# Patient Record
Sex: MALE | Race: WHITE | NOT HISPANIC OR LATINO | URBAN - METROPOLITAN AREA
[De-identification: names, ages, dates, MRNs, and addresses within clinical notes are randomized per-mention and may not be internally consistent; named-entity substitution may affect disease eponyms.]

---

## 2017-01-16 ENCOUNTER — HOSPITAL ENCOUNTER (OUTPATIENT)
Facility: MEDICAL CENTER | Age: 52
End: 2017-01-16
Attending: FAMILY MEDICINE
Payer: COMMERCIAL

## 2017-01-16 ENCOUNTER — OFFICE VISIT (OUTPATIENT)
Dept: INTERNAL MEDICINE | Facility: IMAGING CENTER | Age: 52
End: 2017-01-16
Payer: COMMERCIAL

## 2017-01-16 VITALS
RESPIRATION RATE: 14 BRPM | WEIGHT: 255 LBS | BODY MASS INDEX: 34.54 KG/M2 | DIASTOLIC BLOOD PRESSURE: 74 MMHG | SYSTOLIC BLOOD PRESSURE: 130 MMHG | HEART RATE: 93 BPM | TEMPERATURE: 97.5 F | OXYGEN SATURATION: 80 % | HEIGHT: 72 IN

## 2017-01-16 DIAGNOSIS — Z00.00 WELL ADULT EXAM: ICD-10-CM

## 2017-01-16 DIAGNOSIS — R10.12 CHRONIC BILATERAL UPPER ABDOMINAL PAIN: ICD-10-CM

## 2017-01-16 DIAGNOSIS — E78.5 DYSLIPIDEMIA: ICD-10-CM

## 2017-01-16 DIAGNOSIS — L21.9 SEBORRHEIC DERMATITIS: ICD-10-CM

## 2017-01-16 DIAGNOSIS — K62.89 RECTAL PAIN: ICD-10-CM

## 2017-01-16 DIAGNOSIS — E11.9 TYPE 2 DIABETES MELLITUS WITHOUT COMPLICATION, WITHOUT LONG-TERM CURRENT USE OF INSULIN (HCC): ICD-10-CM

## 2017-01-16 DIAGNOSIS — E11.9 TYPE 2 DIABETES MELLITUS WITHOUT COMPLICATION, WITHOUT LONG-TERM CURRENT USE OF INSULIN (HCC): Primary | ICD-10-CM

## 2017-01-16 DIAGNOSIS — G89.29 CHRONIC BILATERAL UPPER ABDOMINAL PAIN: ICD-10-CM

## 2017-01-16 DIAGNOSIS — R10.11 CHRONIC BILATERAL UPPER ABDOMINAL PAIN: ICD-10-CM

## 2017-01-16 DIAGNOSIS — E66.9 OBESITY (BMI 30-39.9): ICD-10-CM

## 2017-01-16 LAB
25(OH)D3 SERPL-MCNC: 64 NG/ML (ref 30–100)
ALBUMIN SERPL BCP-MCNC: 4.7 G/DL (ref 3.2–4.9)
ALBUMIN/GLOB SERPL: 1.6 G/DL
ALP SERPL-CCNC: 61 U/L (ref 30–99)
ALT SERPL-CCNC: 20 U/L (ref 2–50)
ANION GAP SERPL CALC-SCNC: 10 MMOL/L (ref 0–11.9)
AST SERPL-CCNC: 13 U/L (ref 12–45)
BASOPHILS # BLD AUTO: 0.04 K/UL (ref 0–0.12)
BASOPHILS NFR BLD AUTO: 0.5 % (ref 0–1.8)
BILIRUB SERPL-MCNC: 0.5 MG/DL (ref 0.1–1.5)
BUN SERPL-MCNC: 23 MG/DL (ref 8–22)
CALCIUM SERPL-MCNC: 9.7 MG/DL (ref 8.5–10.5)
CHLORIDE SERPL-SCNC: 100 MMOL/L (ref 96–112)
CO2 SERPL-SCNC: 25 MMOL/L (ref 20–33)
CREAT SERPL-MCNC: 0.94 MG/DL (ref 0.5–1.4)
CREAT UR-MCNC: 60 MG/DL
CRP SERPL HS-MCNC: 2.3 MG/L (ref 0–7.5)
EOSINOPHIL # BLD: 0.12 K/UL (ref 0–0.51)
EOSINOPHIL NFR BLD AUTO: 1.6 % (ref 0–6.9)
ERYTHROCYTE [DISTWIDTH] IN BLOOD BY AUTOMATED COUNT: 40.5 FL (ref 35.9–50)
EST. AVERAGE GLUCOSE BLD GHB EST-MCNC: 246 MG/DL
GLOBULIN SER CALC-MCNC: 3 G/DL (ref 1.9–3.5)
GLUCOSE SERPL-MCNC: 253 MG/DL (ref 65–99)
HBA1C MFR BLD: 10.2 % (ref 0–5.6)
HCT VFR BLD AUTO: 41.4 % (ref 42–52)
HGB BLD-MCNC: 14.2 G/DL (ref 14–18)
IMM GRANULOCYTES # BLD AUTO: 0.04 K/UL (ref 0–0.11)
IMM GRANULOCYTES NFR BLD AUTO: 0.5 % (ref 0–0.9)
LYMPHOCYTES # BLD: 2.43 K/UL (ref 1–4.8)
LYMPHOCYTES NFR BLD AUTO: 33 % (ref 22–41)
MCH RBC QN AUTO: 28.7 PG (ref 27–33)
MCHC RBC AUTO-ENTMCNC: 34.3 G/DL (ref 33.7–35.3)
MCV RBC AUTO: 83.6 FL (ref 81.4–97.8)
MICROALBUMIN UR-MCNC: <0.7 MG/DL
MICROALBUMIN/CREAT UR: NORMAL MG/G (ref 0–30)
MONOCYTES # BLD: 0.56 K/UL (ref 0–0.85)
MONOCYTES NFR BLD AUTO: 7.6 % (ref 0–13.4)
NEUTROPHILS # BLD: 4.17 K/UL (ref 1.82–7.42)
NEUTROPHILS NFR BLD AUTO: 56.8 % (ref 44–72)
NRBC # BLD AUTO: 0 K/UL
NRBC BLD-RTO: 0 /100 WBC
PLATELET # BLD AUTO: 248 K/UL (ref 164–446)
PMV BLD AUTO: 11.7 FL (ref 9–12.9)
POTASSIUM SERPL-SCNC: 4.2 MMOL/L (ref 3.6–5.5)
PROT SERPL-MCNC: 7.7 G/DL (ref 6–8.2)
RBC # BLD AUTO: 4.95 M/UL (ref 4.7–6.1)
SODIUM SERPL-SCNC: 135 MMOL/L (ref 135–145)
TSH SERPL DL<=0.005 MIU/L-ACNC: 1.31 UIU/ML (ref 0.3–3.7)
WBC # BLD AUTO: 7.4 K/UL (ref 4.8–10.8)

## 2017-01-16 PROCEDURE — 83704 LIPOPROTEIN BLD QUAN PART: CPT

## 2017-01-16 PROCEDURE — 80061 LIPID PANEL: CPT

## 2017-01-16 PROCEDURE — 82570 ASSAY OF URINE CREATININE: CPT

## 2017-01-16 PROCEDURE — 82043 UR ALBUMIN QUANTITATIVE: CPT

## 2017-01-16 PROCEDURE — 99214 OFFICE O/P EST MOD 30 MIN: CPT | Mod: 25 | Performed by: FAMILY MEDICINE

## 2017-01-16 PROCEDURE — 86141 C-REACTIVE PROTEIN HS: CPT

## 2017-01-16 PROCEDURE — 80053 COMPREHEN METABOLIC PANEL: CPT

## 2017-01-16 PROCEDURE — 84443 ASSAY THYROID STIM HORMONE: CPT

## 2017-01-16 PROCEDURE — 85025 COMPLETE CBC W/AUTO DIFF WBC: CPT

## 2017-01-16 PROCEDURE — 83036 HEMOGLOBIN GLYCOSYLATED A1C: CPT

## 2017-01-16 PROCEDURE — 82306 VITAMIN D 25 HYDROXY: CPT

## 2017-01-16 RX ORDER — OXYCODONE HYDROCHLORIDE 10 MG/1
10 TABLET ORAL EVERY 12 HOURS PRN
Qty: 60 TAB | Refills: 0 | Status: SHIPPED | OUTPATIENT
Start: 2017-01-16 | End: 2017-01-16 | Stop reason: SDUPTHER

## 2017-01-16 RX ORDER — OXYCODONE HYDROCHLORIDE 10 MG/1
10 TABLET ORAL EVERY 12 HOURS PRN
Qty: 60 TAB | Refills: 0 | Status: SHIPPED | OUTPATIENT
Start: 2017-01-16 | End: 2017-02-17 | Stop reason: SDUPTHER

## 2017-01-16 RX ORDER — OXYCODONE HCL 10 MG/1
10 TABLET, FILM COATED, EXTENDED RELEASE ORAL EVERY 12 HOURS PRN
Qty: 60 EACH | Refills: 0 | Status: SHIPPED | OUTPATIENT
Start: 2017-01-16 | End: 2017-01-16 | Stop reason: SDUPTHER

## 2017-01-16 RX ORDER — OXYCODONE HCL 10 MG/1
10 TABLET, FILM COATED, EXTENDED RELEASE ORAL EVERY 12 HOURS PRN
Qty: 60 EACH | Refills: 0 | Status: SHIPPED | OUTPATIENT
Start: 2017-01-16 | End: 2017-02-17 | Stop reason: SDUPTHER

## 2017-01-16 NOTE — MR AVS SNAPSHOT
"        Anthony QUILES Mark   2017 9:30 AM   Office Visit   MRN: [id number]    Department:  [facility]   Dept Phone:  [phone]    Description:  Male : 1965   Provider:  Zhang Wise M.D.           Allergies as of 2017     No Known Allergies      You were diagnosed with     Rectal pain   [657750]       Chronic bilateral upper abdominal pain   [763021]       Seborrheic dermatitis   [3211231]       Type 2 diabetes mellitus without complication, without long-term current use of insulin (CMS-HCC)   [3192329]       Obesity (BMI 30-39.9)   [418198]       Dyslipidemia   [369179]       Well adult exam   [184183]         Vital Signs     Blood Pressure Pulse Temperature Respirations Height Weight    130/74 mmHg 93 36.4 °C (97.5 °F) 14 1.829 m (6' 0.01\") 115.667 kg (255 lb)    Body Mass Index Oxygen Saturation Smoking Status             34.58 kg/m2 80% Former Smoker         Basic Information     Date Of Birth Sex Race Ethnicity Preferred Language    1965 Male White Non- English      Problem List              ICD-10-CM Priority Class Noted - Resolved    IBD (inflammatory bowel disease) K63.89   Unknown - Present    Ulcer (CMS-HCC) L98.499   Unknown - Present    Dysthymic disorder F34.1   2013 - Present    IBS (irritable bowel syndrome) K58.9   2013 - Present    ASTHMA    2013 - Present    Cervical disc disease M50.90   2013 - Present    Male hypogonadism E29.1   2013 - Present    Right shoulder strain S46.911A   2013 - Present    Former smoker Z87.891   2013 - Present    DM2 (diabetes mellitus, type 2) (CMS-HCC) E11.9   4/3/2014 - Present    Sleep disorder G47.9   4/3/2014 - Present    Obesity E66.9   4/3/2014 - Present    Rectal pain K62.89   4/3/2014 - Present    Essential hypertension, benign I10   2014 - Present    Obesity (BMI 30-39.9) E66.9   2014 - Present    Stress reaction F43.0   2014 - Present      Health Maintenance        Date " Due Completion Dates    IMM HEP B VACCINE (1 of 3 - Primary Series) 1965 ---    RETINAL SCREENING 4/17/1982 ---    IMM INFLUENZA (1) 9/1/2016 ---    A1C SCREENING 12/2/2016 6/2/2016, 9/14/2015, 5/5/2014, 11/10/2012    IMM DTaP/Tdap/Td Vaccine (1 - Tdap) 6/7/2017 (Originally 4/17/1983) ---    IMM PNEUMOCOCCAL 19-64 (ADULT) MEDIUM RISK SERIES (1 of 1 - PPSV23) 6/7/2017 (Originally 4/17/1983) ---    FASTING LIPID PROFILE 6/2/2017 6/2/2016 (Done), 9/14/2015 (Done), 11/10/2012    Override on 6/2/2016: Done    Override on 9/14/2015: Done (See Labs Lipoprotein NMR)    URINE ACR / MICROALBUMIN 6/2/2017 6/2/2016, 9/14/2015, 11/10/2012    SERUM CREATININE 6/2/2017 6/2/2016, 9/14/2015, 11/10/2012    COLONOSCOPY 4/17/2022 4/17/2012 (Prv Comp)    Override on 4/17/2012: Previously completed            Current Immunizations     No immunizations on file.      Below and/or attached are the medications your provider expects you to take. Review all of your home medications and newly ordered medications with your provider and/or pharmacist. Follow medication instructions as directed by your provider and/or pharmacist. Please keep your medication list with you and share with your provider. Update the information when medications are discontinued, doses are changed, or new medications (including over-the-counter products) are added; and carry medication information at all times in the event of emergency situations     Allergies:  No Known Allergies          Medications  Valid as of: January 16, 2017 - 10:33 AM    Generic Name Brand Name Tablet Size Instructions for use    Cholecalciferol (Cap) vitamin D3 5000 UNITS Take 1 Cap by mouth every day.        Cimetidine (Tab) TAGAMET 200 MG Take 2 Tabs by mouth every day.        Clotrimazole (Cream) LOTRIMIN 1 % Apply 1 Application to affected area(s) 2 times a day.        Dapagliflozin Propanediol (Tab) FARXIGA 10 MG TAKE 1 EACH BY MOUTH EVERY DAY.        Flunisolide (Solution) NASALIDE  25 MCG/ACT (0.025%) Spray 2 Sprays in nose 2 Times a Day.        FLUoxetine HCl (Cap) PROZAC 20 MG Take 3 capsules by mouth  every day  Indications: Depression        Glucose Blood (Strip) glucose blood  1 Strip by Other route 3 times a day.        Hydrocortisone (Cream) hydrocortisone 2.5 % Apply 1 Application to affected area(s) 3 times a day.        Hydrocortisone-Aloe Vera (Cream) Hydrocortisone-Aloe Vera 1 % 1 Application by Apply externally route 2 Times a Day.        Imiquimod (Cream) ALDARA 5 % apply a thin layer to affected areas before bedtime 3 times week; wash off 6-10 hours later.        Lisinopril (Tab) PRINIVIL, ZESTRIL 40 MG Take 1 Tab by mouth every day.        Mesalamine (Tablet Delayed Response) LIALDA 1.2 GM Take 4 Tabs by mouth every morning with breakfast.        MetFORMIN HCl (Tab) GLUCOPHAGE 1000 MG Take 1 tablet by mouth two  times daily with meals        Methocarbamol (Tab) ROBAXIN 750 MG Take 1 Tab by mouth every 12 hours as needed.        Nateglinide (Tab) STARLIX 120 MG Take 1 Tab by mouth 3 times a day before meals.        OxyCODONE HCl (Tablet Extended Release 12 hour Abuse-Deterrent) OXYCONTIN 10 MG Take 1 Tab by mouth every 12 hours as needed.        OxyCODONE HCl (Tab) ROXICODONE 10 MG Take 1 Tab by mouth every 12 hours as needed.        Simvastatin (Tab) ZOCOR 40 MG Take 1 tablet by mouth  every day        TraZODone HCl (Tab) DESYREL 50 MG Take 0.5-1 Tabs by mouth at bedtime as needed for Sleep.        .                 Medicines prescribed today were sent to:     [id number] [place] - [address]    [address] [address]    Phone: [phone] Fax: [phone]    Open 24 Hours?: No    [id number] - [place] - [address] [place] [address] AT [address] OF [address] [place] [address]    [address] [place] [address] [place] [address]    Phone: [phone] Fax: [phone]    Open 24 Hours?: No    [facility] - [address]  [address] [place] [address]    Phone: [phone] Fax: [phone]    Open 24 Hours?: No      Medication refill instructions:       If your prescription bottle indicates you have medication refills left, it is not necessary to call your provider’s office. Please contact your pharmacy and they will refill your medication.    If your prescription bottle indicates you do not have any refills left, you may request refills at any time through one of the following ways: The online [facility] system (except Urgent Care), by calling your provider’s office, or by asking your pharmacy to contact your provider’s office with a refill request. Medication refills are processed only during regular business hours and may not be available until the next business day. Your provider may request additional information or to have a follow-up visit with you prior to refilling your medication.   *Please Note: Medication refills are assigned a new Rx number when refilled electronically. Your pharmacy may indicate that no refills were authorized even though a new prescription for the same medication is available at the pharmacy. Please request the medicine by name with the pharmacy before contacting your provider for a refill.        Your To Do List     Future Labs/Procedures Complete By Expires    CBC WITH DIFFERENTIAL  As directed 1/16/2018    COMP METABOLIC PANEL  As directed 1/16/2018    CRP HIGH SENSITIVE (CARDIAC)  As directed 1/16/2018    HEMOGLOBIN A1C  As directed 1/16/2018    LIPOPROTEIN QT BLOOD BY NMR  As directed 1/17/2018    MICROALBUMIN CREAT RATIO URINE  As directed 1/17/2018    TSH WITH REFLEX TO FT4  As directed 1/16/2018    VITAMIN D,25 HYDROXY  As directed 1/17/2018      Other Notes About Your Plan     Colonoscopy  4/17/2012  [facility]   [facility] 06/01/2016;  06/01/2016; Urine Drug Screen 06/02/2016             [facility] Access Code: Activation code not generated  Current [facility] Status:  Active

## 2017-01-16 NOTE — PROGRESS NOTES
SUBJECTIVE:    Chief Complaint   Patient presents with   • Diabetes   • Obesity   • Pain     & GI issues   • Neftaly Flores is a 51 y.o. male,   Established Patient     PROBLEM #1-HISTORY OF PRESENT ILLNESS  Existing Problem, but requiring re-evaluation  PATIENT STATEMENT OF PROBLEM - Diabetes, Obesity  ONSET - years  COURSE - patient is making numerous poor lifestyle choices, counseled at length.   INTENSITY/STATUS/LOCATION/RADIATION - pending labs/ poor Therapeutic Lifestyle Changes   AGGRAVATORS - inadequate Therapeutic Lifestyle Changes    RELIEVERS - meds?   TREATMENTS/COMPLIANCE/@GOAL? - same/ no/ pending labs, obesity persists    PROBLEM #2-HISTORY OF PRESENT ILLNESS  Existing Problem, but requiring re-evaluation  PATIENT STATEMENT OF PROBLEM - Medication refills   ONSET - discussed today    PROBLEM #3-HISTORY OF PRESENT ILLNESS  Existing Problem, but requiring re-evaluation  PATIENT STATEMENT OF PROBLEM - Facial seborrheic dermatitis  ONSET - year+  COURSE - improves somewhat with OTC HC cream, counseled.   INTENSITY/STATUS/LOCATION/RADIATION - mild to moderate/ facial  AGGRAVATORS - autoimmune?  RELIEVERS - as above  TREATMENTS/COMPLIANCE/@GOAL? - same/ as above/ no     No Known Allergies    Patient Active Problem List    Diagnosis Date Noted   • Essential hypertension, benign 05/06/2014   • Obesity (BMI 30-39.9) 05/06/2014   • Stress reaction 05/06/2014   • DM2 (diabetes mellitus, type 2) (CMS-HCC) 04/03/2014   • Sleep disorder 04/03/2014   • Obesity 04/03/2014   • Rectal pain 04/03/2014   • Former smoker 11/22/2013   • Right shoulder strain 06/18/2013   • Dysthymic disorder 04/19/2013   • IBS (irritable bowel syndrome) 04/19/2013   • ASTHMA 04/19/2013   • Cervical disc disease 04/19/2013   • Male hypogonadism 04/19/2013   • IBD (inflammatory bowel disease)    • Ulcer (CMS-HCC)        Outpatient Encounter Prescriptions as of 1/16/2017   Medication Sig Dispense Refill   • oxyCODONE CR  (OXYCONTIN) 10 MG Tablet Extended Release 12 hour Abuse-Deterrent Take 1 Tab by mouth every 12 hours as needed. 60 Each 0   • oxycodone immediate release (ROXICODONE) 10 MG immediate release tablet Take 1 Tab by mouth every 12 hours as needed. 60 Tab 0   • hydrocortisone 2.5 % Cream topical cream Apply 1 Application to affected area(s) 3 times a day. 3 Tube 3   • [DISCONTINUED] oxyCODONE CR (OXYCONTIN) 10 MG Tablet Extended Release 12 hour Abuse-Deterrent Take 1 Tab by mouth every 12 hours as needed. 60 Each 0   • [DISCONTINUED] oxyCODONE CR (OXYCONTIN) 10 MG Tablet Extended Release 12 hour Abuse-Deterrent Take 1 Tab by mouth every 12 hours as needed. 60 Each 0   • [DISCONTINUED] oxycodone immediate release (ROXICODONE) 10 MG immediate release tablet Take 1 Tab by mouth every 12 hours as needed. 60 Tab 0   • [DISCONTINUED] oxycodone immediate release (ROXICODONE) 10 MG immediate release tablet Take 1 Tab by mouth every 12 hours as needed. 60 Tab 0   • [DISCONTINUED] hydrocortisone 2.5 % Cream topical cream Apply 1 Application to affected area(s) 3 times a day. 3 Tube 3   • glucose blood strip 1 Strip by Other route 3 times a day. 300 Strip 4   • lisinopril (PRINIVIL, ZESTRIL) 40 MG tablet Take 1 Tab by mouth every day. 90 Tab 4   • flunisolide (NASALIDE) 25 MCG/ACT (0.025%) Solution Spray 2 Sprays in nose 2 Times a Day. 3 Bottle 4   • trazodone (DESYREL) 50 MG Tab Take 0.5-1 Tabs by mouth at bedtime as needed for Sleep. 90 Tab 1   • Hydrocortisone-Aloe Vera (GNP HYDROCORTISONE/ALOE) 1 % Cream 1 Application by Apply externally route 2 Times a Day. 1 Tube 0   • [DISCONTINUED] oxyCODONE CR (OXYCONTIN) 10 MG Tablet Extended Release 12 hour Abuse-Deterrent Take 1 Tab by mouth every 12 hours as needed. 60 Each 0   • [DISCONTINUED] oxycodone immediate release (ROXICODONE) 10 MG immediate release tablet Take 1 Tab by mouth every 12 hours as needed. 60 Tab 0   • metformin (GLUCOPHAGE) 1000 MG tablet Take 1 tablet by mouth  "two  times daily with meals 180 Tab 3   • simvastatin (ZOCOR) 40 MG Tab Take 1 tablet by mouth  every day 90 Tab 3   • fluoxetine (PROZAC) 20 MG Cap Take 3 capsules by mouth  every day  Indications: Depression 270 Cap 3   • FARXIGA 10 MG Tab TAKE 1 EACH BY MOUTH EVERY DAY. 90 Tab 3   • Cholecalciferol (VITAMIN D3) 5000 UNITS Cap Take 1 Cap by mouth every day.     • mesalamine (LIALDA) 1.2 GM Tablet Delayed Response Take 4 Tabs by mouth every morning with breakfast. 500 Each 4   • nateglinide (STARLIX) 120 MG Tab Take 1 Tab by mouth 3 times a day before meals. 270 Tab 4   • clotrimazole (LOTRIMIN) 1 % Cream Apply 1 Application to affected area(s) 2 times a day. 1 Tube 0   • imiquimod (ALDARA) 5 % cream apply a thin layer to affected areas before bedtime 3 times week; wash off 6-10 hours later. 36 Each 4   • methocarbamol (ROBAXIN) 750 MG TABS Take 1 Tab by mouth every 12 hours as needed. 60 Tab 6   • cimetidine (TAGAMET) 200 MG tablet Take 2 Tabs by mouth every day.       No facility-administered encounter medications on file as of 1/16/2017.       Social History   Substance Use Topics   • Smoking status: Former Smoker -- 1.00 packs/day for 8 years     Types: Cigarettes     Quit date: 01/01/1990   • Smokeless tobacco: Never Used   • Alcohol Use: 0.0 oz/week     0 Standard drinks or equivalent per week       Family History   Problem Relation Age of Onset   • Heart Disease Mother    • Diabetes Father    • Heart Disease Father    • Hypertension Father    • Hyperlipidemia Father    • Diabetes Brother        Patient's Past, Social, and Family History reviewed and updated by me in EPIC today.    REVIEW OF SYMPTOMS:               Pertinent Positives as above.    All other systems reviewed and negative.     OBJECTIVE:    /74 mmHg  Pulse 93  Temp(Src) 36.4 °C (97.5 °F)  Resp 14  Ht 1.829 m (6' 0.01\")  Wt 115.667 kg (255 lb)  BMI 34.58 kg/m2  SpO2 80%  Body mass index is 34.58 kg/(m^2).    Well developed, well " nourished obese male, no acute distress, non-ill appearing. Comfortable, appears stated age, pleasant and cooperative  HEAD: atraumatic, normocephalic   EYES: Conjunctiva normal, EOMI, PERRLA, acuity grossly intact.   EARS/NOSE/THROAT: TM's normal, no SSX of infection, no perforation, no hemotympanum, acuity grossly intact. Oropharynx: benign, no lesions noted. Nares: benign.   NECK: supple, no adenopathy, no thyromegaly or nodules, no JVD, no carotid bruits.   CHEST/LUNGS: clear to auscultation and percussion bilaterally. No adventitious breath sounds.   CARDIOVASCULAR: regular rate and rhythm, no murmur. PMI not displaced. Good central and peripheral pulses.   BACK: no CVA tenderness.   ABDOMEN: soft, non-tender, non-distended, no masses, no hepatosplenomegaly. Normal active bowel tones.   : deferred.   Rectal: deferred.   Extremities: warm/well-perfused, no cyanosis, clubbing, or edema.   SKIN: mild red scaly facial rash  Neuro: Mental Status: Alert and Oriented x 3. CN II-XII grossly intact. Gait normal. Non-focal, intact. Normal strength, sensation    ASSESSMENT:    1. Type 2 diabetes mellitus without complication, without long-term current use of insulin ([facility])  HEMOGLOBIN A1C   2. Obesity (BMI 30-39.9)     3. Rectal pain  oxyCODONE CR (OXYCONTIN) 10 MG Tablet Extended Release 12 hour Abuse-Deterrent    DISCONTINUED: oxyCODONE CR (OXYCONTIN) 10 MG Tablet Extended Release 12 hour Abuse-Deterrent    DISCONTINUED: oxyCODONE CR (OXYCONTIN) 10 MG Tablet Extended Release 12 hour Abuse-Deterrent   4. Chronic bilateral upper abdominal pain  oxycodone immediate release (ROXICODONE) 10 MG immediate release tablet    DISCONTINUED: oxycodone immediate release (ROXICODONE) 10 MG immediate release tablet    DISCONTINUED: oxycodone immediate release (ROXICODONE) 10 MG immediate release tablet   5. Seborrheic dermatitis  hydrocortisone 2.5 % Cream topical cream    DISCONTINUED: hydrocortisone 2.5 % Cream topical cream   6.  Dyslipidemia  COMP METABOLIC PANEL    MICROALBUMIN CREAT RATIO URINE    CRP HIGH SENSITIVE (CARDIAC)    LIPOPROTEIN QT BLOOD BY NMR    TSH WITH REFLEX TO FT4    CBC WITH DIFFERENTIAL   7. Well adult exam  VITAMIN D,25 HYDROXY       PLAN:    Total Face-to-Face time spent with patient: 30 minutes  Amount of time spent counseling patient and/or coordinating care: 20 minutes    The nature of patient counseling as below:  -Patient Education, including below topics:  -Differential Diagnoses and treatment options discussed  -Risks, benefits, alternatives discussed  -Health Maintenance Exam issues discussed  -Exercise  -Dietary recommendations discussed  -Weight Loss strategies discussed  -Therapeutic Lifestyle Changes discussed    The nature of coordination of care as below:  -Medications added: none  -Medications discontinued: none  -Medications adjusted: refills  -Continue (other) present chronic medications  -Blood Sugar Diary  -Labs: as above  -Referrals: no  -Other: no  -Seek medical attention immediately if worse    FOLLOW-UP:  -in 3 months  -and sooner if test/consult results warrant  And for Health Care Maintenance Exams  And as needed.

## 2017-01-17 NOTE — PATIENT INSTRUCTIONS
Current Outpatient Prescriptions Ordered in [facility]   Medication Sig Dispense Refill   • oxyCODONE CR (OXYCONTIN) 10 MG Tablet Extended Release 12 hour Abuse-Deterrent Take 1 Tab by mouth every 12 hours as needed. 60 Each 0   • oxycodone immediate release (ROXICODONE) 10 MG immediate release tablet Take 1 Tab by mouth every 12 hours as needed. 60 Tab 0   • hydrocortisone 2.5 % Cream topical cream Apply 1 Application to affected area(s) 3 times a day. 3 Tube 3   • glucose blood strip 1 Strip by Other route 3 times a day. 300 Strip 4   • lisinopril (PRINIVIL, ZESTRIL) 40 MG tablet Take 1 Tab by mouth every day. 90 Tab 4   • flunisolide (NASALIDE) 25 MCG/ACT (0.025%) Solution Spray 2 Sprays in nose 2 Times a Day. 3 Bottle 4   • trazodone (DESYREL) 50 MG Tab Take 0.5-1 Tabs by mouth at bedtime as needed for Sleep. 90 Tab 1   • Hydrocortisone-Aloe Vera (GNP HYDROCORTISONE/ALOE) 1 % Cream 1 Application by Apply externally route 2 Times a Day. 1 Tube 0   • metformin (GLUCOPHAGE) 1000 MG tablet Take 1 tablet by mouth two  times daily with meals 180 Tab 3   • simvastatin (ZOCOR) 40 MG Tab Take 1 tablet by mouth  every day 90 Tab 3   • fluoxetine (PROZAC) 20 MG Cap Take 3 capsules by mouth  every day  Indications: Depression 270 Cap 3   • FARXIGA 10 MG Tab TAKE 1 EACH BY MOUTH EVERY DAY. 90 Tab 3   • Cholecalciferol (VITAMIN D3) 5000 UNITS Cap Take 1 Cap by mouth every day.     • mesalamine (LIALDA) 1.2 GM Tablet Delayed Response Take 4 Tabs by mouth every morning with breakfast. 500 Each 4   • nateglinide (STARLIX) 120 MG Tab Take 1 Tab by mouth 3 times a day before meals. 270 Tab 4   • clotrimazole (LOTRIMIN) 1 % Cream Apply 1 Application to affected area(s) 2 times a day. 1 Tube 0   • imiquimod (ALDARA) 5 % cream apply a thin layer to affected areas before bedtime 3 times week; wash off 6-10 hours later. 36 Each 4   • methocarbamol (ROBAXIN) 750 MG TABS Take 1 Tab by mouth every 12 hours as needed. 60 Tab 6   • cimetidine  (TAGAMET) 200 MG tablet Take 2 Tabs by mouth every day.       No current [facility]-ordered facility-administered medications on file.

## 2017-01-19 LAB
CHOLEST SERPL-MCNC: 178 MG/DL (ref 100–199)
HDL PARTICAL NO Q4363: 32.4 UMOL/L
HDL SERPL QN: 8.9 NM
HDLC SERPL-MCNC: 59 MG/DL
HLD.LARGE SERPL-SCNC: 5.3 UMOL/L
LDL MED SERPL QN: 20.6 NM
LDL SERPL QN: 20.6 NM
LDL SERPL-SCNC: 1465 NMOL/L
LDL SMALL SERPL-SCNC: 519 NMOL/L
LDL SMALL SERPL-SCNC: 519 NMOL/L
LDLC SERPL CALC-MCNC: 100 MG/DL (ref 0–99)
LP IR SCORE Q4364: 60
TRIGL SERPL-MCNC: 94 MG/DL (ref 0–149)
VLDL LARGE SERPL-SCNC: 2.6 NMOL/L
VLDL SERPL QN: 52.6 NM

## 2017-01-23 RX ORDER — TRAZODONE HYDROCHLORIDE 50 MG/1
TABLET ORAL
Qty: 90 TAB | Refills: 1 | Status: SHIPPED | OUTPATIENT
Start: 2017-01-23 | End: 2017-10-02

## 2017-01-30 DIAGNOSIS — J01.90 ACUTE NON-RECURRENT SINUSITIS, UNSPECIFIED LOCATION: ICD-10-CM

## 2017-01-30 RX ORDER — AZITHROMYCIN 250 MG/1
TABLET, FILM COATED ORAL
Qty: 6 TAB | Refills: 0 | Status: SHIPPED | OUTPATIENT
Start: 2017-01-30 | End: 2017-04-24

## 2017-02-17 DIAGNOSIS — R10.12 CHRONIC BILATERAL UPPER ABDOMINAL PAIN: ICD-10-CM

## 2017-02-17 DIAGNOSIS — R10.11 CHRONIC BILATERAL UPPER ABDOMINAL PAIN: ICD-10-CM

## 2017-02-17 DIAGNOSIS — K62.89 RECTAL PAIN: ICD-10-CM

## 2017-02-17 DIAGNOSIS — G89.29 CHRONIC BILATERAL UPPER ABDOMINAL PAIN: ICD-10-CM

## 2017-02-17 RX ORDER — OXYCODONE HCL 10 MG/1
10 TABLET, FILM COATED, EXTENDED RELEASE ORAL EVERY 12 HOURS PRN
Qty: 60 EACH | Refills: 0 | Status: SHIPPED | OUTPATIENT
Start: 2017-02-17 | End: 2017-04-24 | Stop reason: SDUPTHER

## 2017-02-17 RX ORDER — OXYCODONE HYDROCHLORIDE 10 MG/1
10 TABLET ORAL EVERY 12 HOURS PRN
Qty: 60 TAB | Refills: 0 | Status: SHIPPED | OUTPATIENT
Start: 2017-02-17 | End: 2017-04-24 | Stop reason: SDUPTHER

## 2017-04-10 DIAGNOSIS — E78.5 DYSLIPIDEMIA: ICD-10-CM

## 2017-04-10 DIAGNOSIS — E11.9 TYPE 2 DIABETES MELLITUS WITHOUT COMPLICATION, WITHOUT LONG-TERM CURRENT USE OF INSULIN (HCC): ICD-10-CM

## 2017-04-10 DIAGNOSIS — K58.2 IRRITABLE BOWEL SYNDROME WITH BOTH CONSTIPATION AND DIARRHEA: ICD-10-CM

## 2017-04-10 DIAGNOSIS — F34.1 DYSTHYMIC DISORDER: ICD-10-CM

## 2017-04-10 DIAGNOSIS — I10 ESSENTIAL HYPERTENSION, BENIGN: ICD-10-CM

## 2017-04-10 RX ORDER — NATEGLINIDE 120 MG/1
120 TABLET ORAL
Qty: 270 TAB | Refills: 4 | Status: SHIPPED | OUTPATIENT
Start: 2017-04-10 | End: 2017-04-10 | Stop reason: SDUPTHER

## 2017-04-10 RX ORDER — LISINOPRIL 40 MG/1
40 TABLET ORAL DAILY
Qty: 90 TAB | Refills: 4 | Status: SHIPPED | OUTPATIENT
Start: 2017-04-10 | End: 2017-10-02 | Stop reason: SDUPTHER

## 2017-04-10 RX ORDER — FLUOXETINE HYDROCHLORIDE 20 MG/1
60 CAPSULE ORAL DAILY
Qty: 270 CAP | Refills: 4 | Status: SHIPPED | OUTPATIENT
Start: 2017-04-10 | End: 2017-04-10 | Stop reason: SDUPTHER

## 2017-04-10 RX ORDER — MESALAMINE 1.2 G/1
4.8 TABLET, DELAYED RELEASE ORAL
Qty: 360 EACH | Refills: 4 | Status: SHIPPED | OUTPATIENT
Start: 2017-04-10 | End: 2017-04-10 | Stop reason: SDUPTHER

## 2017-04-10 RX ORDER — SIMVASTATIN 40 MG
40 TABLET ORAL DAILY
Qty: 90 TAB | Refills: 4 | Status: SHIPPED | OUTPATIENT
Start: 2017-04-10 | End: 2017-04-10 | Stop reason: SDUPTHER

## 2017-04-11 RX ORDER — MESALAMINE 1.2 G/1
4.8 TABLET, DELAYED RELEASE ORAL
Qty: 360 EACH | Refills: 4 | Status: SHIPPED | OUTPATIENT
Start: 2017-04-11 | End: 2017-10-02 | Stop reason: SDUPTHER

## 2017-04-11 RX ORDER — FLUOXETINE HYDROCHLORIDE 20 MG/1
60 CAPSULE ORAL DAILY
Qty: 270 CAP | Refills: 4 | Status: SHIPPED | OUTPATIENT
Start: 2017-04-11 | End: 2017-10-02 | Stop reason: SDUPTHER

## 2017-04-11 RX ORDER — NATEGLINIDE 120 MG/1
120 TABLET ORAL
Qty: 270 TAB | Refills: 4 | Status: SHIPPED | OUTPATIENT
Start: 2017-04-11 | End: 2017-10-02 | Stop reason: SDUPTHER

## 2017-04-11 RX ORDER — SIMVASTATIN 40 MG
40 TABLET ORAL DAILY
Qty: 90 TAB | Refills: 4 | Status: SHIPPED | OUTPATIENT
Start: 2017-04-11 | End: 2017-10-02 | Stop reason: SDUPTHER

## 2017-04-12 DIAGNOSIS — E11.9 TYPE 2 DIABETES MELLITUS WITHOUT COMPLICATION, WITHOUT LONG-TERM CURRENT USE OF INSULIN (HCC): ICD-10-CM

## 2017-04-12 NOTE — PROGRESS NOTES
Patient's current glucometer & test strips are non formulary.  [facility] Contour Next Glucometer & test strip rx sent to preferred pharmacy.

## 2017-04-24 ENCOUNTER — OFFICE VISIT (OUTPATIENT)
Dept: INTERNAL MEDICINE | Facility: IMAGING CENTER | Age: 52
End: 2017-04-24
Payer: COMMERCIAL

## 2017-04-24 VITALS
WEIGHT: 245 LBS | DIASTOLIC BLOOD PRESSURE: 74 MMHG | HEIGHT: 72 IN | BODY MASS INDEX: 33.18 KG/M2 | SYSTOLIC BLOOD PRESSURE: 120 MMHG | HEART RATE: 86 BPM | OXYGEN SATURATION: 95 % | RESPIRATION RATE: 14 BRPM | TEMPERATURE: 97.5 F

## 2017-04-24 DIAGNOSIS — E11.9 TYPE 2 DIABETES MELLITUS WITHOUT COMPLICATION, WITHOUT LONG-TERM CURRENT USE OF INSULIN (HCC): Primary | ICD-10-CM

## 2017-04-24 DIAGNOSIS — R10.12 CHRONIC BILATERAL UPPER ABDOMINAL PAIN: ICD-10-CM

## 2017-04-24 DIAGNOSIS — G89.29 CHRONIC BILATERAL UPPER ABDOMINAL PAIN: ICD-10-CM

## 2017-04-24 DIAGNOSIS — K62.89 RECTAL PAIN: ICD-10-CM

## 2017-04-24 DIAGNOSIS — R10.11 CHRONIC BILATERAL UPPER ABDOMINAL PAIN: ICD-10-CM

## 2017-04-24 DIAGNOSIS — K58.2 IRRITABLE BOWEL SYNDROME WITH BOTH CONSTIPATION AND DIARRHEA: ICD-10-CM

## 2017-04-24 DIAGNOSIS — R53.81 PHYSICAL DECONDITIONING: ICD-10-CM

## 2017-04-24 DIAGNOSIS — E66.9 OBESITY (BMI 30-39.9): ICD-10-CM

## 2017-04-24 PROCEDURE — 99214 OFFICE O/P EST MOD 30 MIN: CPT | Performed by: FAMILY MEDICINE

## 2017-04-24 RX ORDER — OXYCODONE HCL 10 MG/1
10 TABLET, FILM COATED, EXTENDED RELEASE ORAL EVERY 12 HOURS PRN
Qty: 60 EACH | Refills: 0 | Status: SHIPPED | OUTPATIENT
Start: 2017-04-24 | End: 2017-04-24 | Stop reason: SDUPTHER

## 2017-04-24 RX ORDER — OXYCODONE HYDROCHLORIDE 10 MG/1
10 TABLET ORAL EVERY 12 HOURS PRN
Qty: 60 TAB | Refills: 0 | Status: SHIPPED | OUTPATIENT
Start: 2017-04-24 | End: 2017-04-24 | Stop reason: SDUPTHER

## 2017-04-24 RX ORDER — OXYCODONE HCL 10 MG/1
10 TABLET, FILM COATED, EXTENDED RELEASE ORAL EVERY 12 HOURS PRN
Qty: 60 EACH | Refills: 0 | Status: SHIPPED | OUTPATIENT
Start: 2017-04-24 | End: 2017-10-02 | Stop reason: SDUPTHER

## 2017-04-24 RX ORDER — OXYCODONE HYDROCHLORIDE 10 MG/1
10 TABLET ORAL EVERY 12 HOURS PRN
Qty: 60 TAB | Refills: 0 | Status: SHIPPED | OUTPATIENT
Start: 2017-04-24 | End: 2017-10-02 | Stop reason: SDUPTHER

## 2017-04-24 NOTE — MR AVS SNAPSHOT
"        Anthony Flores   2017 8:30 AM   Office Visit   MRN: [id number]    Department:  [facility]   Dept Phone:  [phone]    Description:  Male : 1965   Provider:  Zhang Wise M.D.           Reason for Visit     Non Insulin Dependent Diabetes           Allergies as of 2017     No Known Allergies      You were diagnosed with     Rectal pain   [423124]       Chronic bilateral upper abdominal pain   [489133]         Vital Signs     Blood Pressure Pulse Temperature Respirations Height Weight    120/74 mmHg 86 36.4 °C (97.5 °F) 14 1.829 m (6' 0.01\") 111.131 kg (245 lb)    Body Mass Index Oxygen Saturation Smoking Status             33.22 kg/m2 95% Former Smoker         Basic Information     Date Of Birth Sex Race Ethnicity Preferred Language    1965 Male White Non- English      Problem List              ICD-10-CM Priority Class Noted - Resolved    IBD (inflammatory bowel disease) K63.89   Unknown - Present    Ulcer (CMS-HCC) L98.499   Unknown - Present    Dysthymic disorder F34.1   2013 - Present    IBS (irritable bowel syndrome) K58.9   2013 - Present    ASTHMA    2013 - Present    Cervical disc disease M50.90   2013 - Present    Male hypogonadism E29.1   2013 - Present    Right shoulder strain S46.911A   2013 - Present    Former smoker Z87.891   2013 - Present    DM2 (diabetes mellitus, type 2) (CMS-[facility]) E11.9   4/3/2014 - Present    Sleep disorder G47.9   4/3/2014 - Present    Obesity E66.9   4/3/2014 - Present    Rectal pain K62.89   4/3/2014 - Present    Essential hypertension, benign I10   2014 - Present    Obesity (BMI 30-39.9) E66.9   2014 - Present    Stress reaction F43.0   2014 - Present      Health Maintenance        Date Due Completion Dates    IMM HEP B VACCINE (1 of 3 - Primary Series) 1965 ---    RETINAL SCREENING 1982 ---    IMM DTaP/Tdap/Td Vaccine (1 - Tdap) 2017 (Originally 1983) ---   " IMM PNEUMOCOCCAL 19-64 (ADULT) MEDIUM RISK SERIES (1 of 1 - PPSV23) 6/7/2017 (Originally 4/17/1983) ---    FASTING LIPID PROFILE 6/2/2017 6/2/2016 (Done), 9/14/2015 (Done), 11/10/2012    Override on 6/2/2016: Done    Override on 9/14/2015: Done (See Labs Lipoprotein NMR)    A1C SCREENING 7/16/2017 1/16/2017, 6/2/2016, 9/14/2015, 5/5/2014, 11/10/2012    URINE ACR / MICROALBUMIN 1/16/2018 1/16/2017, 6/2/2016, 9/14/2015, 11/10/2012    SERUM CREATININE 1/16/2018 1/16/2017, 6/2/2016, 9/14/2015, 11/10/2012    COLONOSCOPY 4/17/2022 4/17/2012 (Prv Comp)    Override on 4/17/2012: Previously completed            Current Immunizations     No immunizations on file.      Below and/or attached are the medications your provider expects you to take. Review all of your home medications and newly ordered medications with your provider and/or pharmacist. Follow medication instructions as directed by your provider and/or pharmacist. Please keep your medication list with you and share with your provider. Update the information when medications are discontinued, doses are changed, or new medications (including over-the-counter products) are added; and carry medication information at all times in the event of emergency situations     Allergies:  No Known Allergies          Medications  Valid as of: April 24, 2017 -  9:29 AM    Generic Name Brand Name Tablet Size Instructions for use    Blood Glucose Monitoring Suppl (Device) Blood Glucose Monitoring Suppl  Meter: Dispense Device of Insurance Preference. Sig. Use as directed for blood sugar monitoring. #1. NR.        Blood Glucose Monitoring Suppl (Misc) Blood Glucose Monitoring Suppl SUPPLIES Sig: use 3 times/day and prn ssx high or low sugar #300 RF x 3        Cholecalciferol (Cap) vitamin D3 5000 UNITS Take 1 Cap by mouth every day.        Cimetidine (Tab) TAGAMET 200 MG Take 2 Tabs by mouth every day.        Clotrimazole (Cream) LOTRIMIN 1 % Apply 1 Application to affected area(s) 2  times a day.        Dapagliflozin Propanediol (Tab) FARXIGA 10 MG TAKE 1 EACH BY MOUTH EVERY DAY.        Flunisolide (Solution) NASALIDE 25 MCG/ACT (0.025%) Spray 2 Sprays in nose 2 Times a Day.        FLUoxetine HCl (Cap) PROZAC 20 MG Take 3 Caps by mouth every day.        Hydrocortisone (Cream) hydrocortisone 2.5 % Apply 1 Application to affected area(s) 3 times a day.        Hydrocortisone-Aloe Vera (Cream) Hydrocortisone-Aloe Vera 1 % 1 Application by Apply externally route 2 Times a Day.        Imiquimod (Cream) ALDARA 5 % apply a thin layer to affected areas before bedtime 3 times week; wash off 6-10 hours later.        Lisinopril (Tab) PRINIVIL, ZESTRIL 40 MG Take 1 Tab by mouth every day.        Mesalamine (Tablet Delayed Response) LIALDA 1.2 GM Take 4 Tabs by mouth every morning with breakfast.        MetFORMIN HCl (Tab) GLUCOPHAGE 1000 MG Take 1 Tab by mouth 2 times a day.        Methocarbamol (Tab) ROBAXIN 750 MG Take 1 Tab by mouth every 12 hours as needed.        Nateglinide (Tab) STARLIX 120 MG Take 1 Tab by mouth 3 times a day before meals.        OxyCODONE HCl (Tablet Extended Release 12 hour Abuse-Deterrent) OXYCONTIN 10 MG Take 1 Tab by mouth every 12 hours as needed.        OxyCODONE HCl (Tab) ROXICODONE 10 MG Take 1 Tab by mouth every 12 hours as needed.        Simvastatin (Tab) ZOCOR 40 MG Take 1 Tab by mouth every day.        TraZODone HCl (Tab) DESYREL 50 MG TAKE 1/2 TO 1 TABLET BY  MOUTH AT BEDTIME AS NEEDED  FOR SLEEP.        .                 Medicines prescribed today were sent to:     [id number] [place] - [address]    [address] [facility] [facility] [place] [address]    Phone: [phone] Fax: [phone]    Open 24 Hours?: No    [id number] - [place] - [address] AT CORNER OF [address] [place] [address]    [address] [place] [address]    Phone: [phone] Fax: [phone]    Open 24 Hours?: No    [facility] - [place], [place] - [address]  [facility]    [address] [address] [facility] [address]    Phone: [phone] Fax: [phone]    Open 24 Hours?: No    [id number] - [place], [place] - [address]    [address] [facility] [address]    Phone: [phone] Fax: [phone]    Open 24 Hours?: No      Medication refill instructions:       If your prescription bottle indicates you have medication refills left, it is not necessary to call your provider’s office. Please contact your pharmacy and they will refill your medication.    If your prescription bottle indicates you do not have any refills left, you may request refills at any time through one of the following ways: The online [facility] system (except Urgent Care), by calling your provider’s office, or by asking your pharmacy to contact your provider’s office with a refill request. Medication refills are processed only during regular business hours and may not be available until the next business day. Your provider may request additional information or to have a follow-up visit with you prior to refilling your medication.   *Please Note: Medication refills are assigned a new Rx number when refilled electronically. Your pharmacy may indicate that no refills were authorized even though a new prescription for the same medication is available at the pharmacy. Please request the medicine by name with the pharmacy before contacting your provider for a refill.        Other Notes About Your Plan     Colonoscopy  4/17/2012  [facility]   CSA 06/01/2016;  06/01/2016; Urine Drug Screen 06/02/2016             [employer] Access Code: Activation code not generated  Current [facility] Status: Active

## 2017-04-24 NOTE — PROGRESS NOTES
SUBJECTIVE:    Chief Complaint   Patient presents with   • Non Insulin Dependent Diabetes   • Rectal Pain   • Irritable Bowel Syndrome       Anthony Flores is a 52 y.o. male,   Established Patient     PROBLEM #1-HISTORY OF PRESENT ILLNESS  Existing Problem, but requiring re-evaluation  PATIENT STATEMENT OF PROBLEM - DM2, Obesity, inactivity, poor Therapeutic Lifestyle Changes   ONSET - years  COURSE - he is down a few lbs. We reviewed his most recent labs.  He admits to poor Therapeutic Lifestyle Changes; he may be amenable to change. Counseled.   INTENSITY/STATUS/LOCATION/RADIATION - moderate+/ present  AGGRAVATORS - Multifactorial including inadequate Therapeutic Lifestyle Changes    RELIEVERS - meds?  TREATMENTS/COMPLIANCE/@GOAL? - same/ poor/ no     PROBLEM #2-HISTORY OF PRESENT ILLNESS  Existing Problem, but requiring re-evaluation  PATIENT STATEMENT OF PROBLEM - Rectal Pain & IBS  ONSET - years  COURSE - he is on multiple meds including Lialda and oxycodone and symptoms are stable, but still problematic.   INTENSITY/STATUS/LOCATION/RADIATION - moderate/ present  AGGRAVATORS - Multifactorial   RELIEVERS - meds  TREATMENTS/COMPLIANCE/@GOAL? - same/ inadequate Therapeutic Lifestyle Changes / stable on meds    No Known Allergies    Patient Active Problem List    Diagnosis Date Noted   • Irritable bowel syndrome with both constipation and diarrhea 04/24/2017   • Chronic bilateral upper abdominal pain 04/24/2017   • Physical deconditioning 04/24/2017   • Type 2 diabetes mellitus without complication, without long-term current use of insulin ([facility]) 04/24/2017   • Essential hypertension, benign 05/06/2014   • Obesity (BMI 30-39.9) 05/06/2014   • Stress reaction 05/06/2014   • DM2 (diabetes mellitus, type 2) (CMS-[facility]) 04/03/2014   • Sleep disorder 04/03/2014   • Obesity 04/03/2014   • Rectal pain 04/03/2014   • Former smoker 11/22/2013   • Right shoulder strain 06/18/2013   • Dysthymic disorder 04/19/2013   • IBS  (irritable bowel syndrome) 04/19/2013   • ASTHMA 04/19/2013   • Cervical disc disease 04/19/2013   • Male hypogonadism 04/19/2013   • IBD (inflammatory bowel disease)    • Ulcer (CMS-HCC)        Outpatient Encounter Prescriptions as of 4/24/2017   Medication Sig Dispense Refill   • oxyCODONE CR (OXYCONTIN) 10 MG Tablet Extended Release 12 hour Abuse-Deterrent Take 1 Tab by mouth every 12 hours as needed. 60 Each 0   • oxycodone immediate release (ROXICODONE) 10 MG immediate release tablet Take 1 Tab by mouth every 12 hours as needed. 60 Tab 0   • [DISCONTINUED] oxyCODONE CR (OXYCONTIN) 10 MG Tablet Extended Release 12 hour Abuse-Deterrent Take 1 Tab by mouth every 12 hours as needed. 60 Each 0   • [DISCONTINUED] oxyCODONE CR (OXYCONTIN) 10 MG Tablet Extended Release 12 hour Abuse-Deterrent Take 1 Tab by mouth every 12 hours as needed. 60 Each 0   • [DISCONTINUED] oxycodone immediate release (ROXICODONE) 10 MG immediate release tablet Take 1 Tab by mouth every 12 hours as needed. 60 Tab 0   • [DISCONTINUED] oxycodone immediate release (ROXICODONE) 10 MG immediate release tablet Take 1 Tab by mouth every 12 hours as needed. 60 Tab 0   • Blood Glucose Monitoring Suppl Device Meter: Dispense Device of Insurance Preference. Sig. Use as directed for blood sugar monitoring. #1. NR. 1 Device 0   • Blood Glucose Monitoring Suppl SUPPLIES Misc Sig: use 3 times/day and prn ssx high or low sugar #300 RF x 3 300 Strip 3   • fluoxetine (PROZAC) 20 MG Cap Take 3 Caps by mouth every day. 270 Cap 4   • mesalamine (LIALDA) 1.2 GM Tablet Delayed Response Take 4 Tabs by mouth every morning with breakfast. 360 Each 4   • metformin (GLUCOPHAGE) 1000 MG tablet Take 1 Tab by mouth 2 times a day. 180 Tab 4   • nateglinide (STARLIX) 120 MG Tab Take 1 Tab by mouth 3 times a day before meals. 270 Tab 4   • simvastatin (ZOCOR) 40 MG Tab Take 1 Tab by mouth every day. 90 Tab 4   • lisinopril (PRINIVIL, ZESTRIL) 40 MG tablet Take 1 Tab by mouth  every day. 90 Tab 4   • [DISCONTINUED] oxyCODONE CR (OXYCONTIN) 10 MG Tablet Extended Release 12 hour Abuse-Deterrent Take 1 Tab by mouth every 12 hours as needed. 60 Each 0   • [DISCONTINUED] oxycodone immediate release (ROXICODONE) 10 MG immediate release tablet Take 1 Tab by mouth every 12 hours as needed. 60 Tab 0   • [DISCONTINUED] azithromycin (ZITHROMAX) 250 MG Tab Take 2 tablets PO on day#1 and then 1 tablet PO daily for days#2-5. 6 Tab 0   • trazodone (DESYREL) 50 MG Tab TAKE 1/2 TO 1 TABLET BY  MOUTH AT BEDTIME AS NEEDED  FOR SLEEP. 90 Tab 1   • hydrocortisone 2.5 % Cream topical cream Apply 1 Application to affected area(s) 3 times a day. 3 Tube 3   • flunisolide (NASALIDE) 25 MCG/ACT (0.025%) Solution Spray 2 Sprays in nose 2 Times a Day. 3 Bottle 4   • Hydrocortisone-Aloe Vera (GNP HYDROCORTISONE/ALOE) 1 % Cream 1 Application by Apply externally route 2 Times a Day. 1 Tube 0   • FARXIGA 10 MG Tab TAKE 1 EACH BY MOUTH EVERY DAY. 90 Tab 3   • Cholecalciferol (VITAMIN D3) 5000 UNITS Cap Take 1 Cap by mouth every day.     • clotrimazole (LOTRIMIN) 1 % Cream Apply 1 Application to affected area(s) 2 times a day. 1 Tube 0   • imiquimod (ALDARA) 5 % cream apply a thin layer to affected areas before bedtime 3 times week; wash off 6-10 hours later. 36 Each 4   • methocarbamol (ROBAXIN) 750 MG TABS Take 1 Tab by mouth every 12 hours as needed. 60 Tab 6   • cimetidine (TAGAMET) 200 MG tablet Take 2 Tabs by mouth every day.       No facility-administered encounter medications on file as of 4/24/2017.       Social History   Substance Use Topics   • Smoking status: Former Smoker -- 1.00 packs/day for 8 years     Types: Cigarettes     Quit date: 01/01/1990   • Smokeless tobacco: Never Used   • Alcohol Use: 0.0 oz/week     0 Standard drinks or equivalent per week       Family History   Problem Relation Age of Onset   • Heart Disease Mother    • Diabetes Father    • Heart Disease Father    • Hypertension Father    •  "Hyperlipidemia Father    • Diabetes Brother        Patient's Past, Social, and Family History reviewed and updated by me in EPIC today.    REVIEW OF SYMPTOMS:               Pertinent Positives as above.    All other systems reviewed and negative.     OBJECTIVE:    /74 mmHg  Pulse 86  Temp(Src) 36.4 °C (97.5 °F)  Resp 14  Ht 1.829 m (6' 0.01\")  Wt 111.131 kg (245 lb)  BMI 33.22 kg/m2  SpO2 95%  Body mass index is 33.22 kg/(m^2).    Well developed, well nourished male, no acute distress, non-ill appearing. Comfortable, appears stated age, pleasant and cooperative  HEAD: atraumatic, normocephalic   EYES: Conjunctiva normal, EOMI, PERRLA, acuity grossly intact.   EARS/NOSE/THROAT: TM's normal, no SSX of infection, no perforation, no hemotympanum, acuity grossly intact. Oropharynx: benign, no lesions noted. Nares: benign.   NECK: supple, no adenopathy, no thyromegaly or nodules, no JVD, no carotid bruits.   CHEST/LUNGS: clear to auscultation and percussion bilaterally. No adventitious breath sounds.   CARDIOVASCULAR: regular rate and rhythm, no murmur. PMI not displaced. Good central and peripheral pulses.   BACK: no CVA tenderness.   ABDOMEN: soft, non-tender, non-distended, no masses, no hepatosplenomegaly. Normal active bowel tones.   : deferred.   Rectal: deferred.   Extremities: warm/well-perfused, no cyanosis, clubbing, or edema.   SKIN: clear, unbroken, no rashes, normal turgor.   Neuro: Mental Status: Alert and Oriented x 3. CN II-XII grossly intact. Gait normal. Non-focal, intact. Normal strength, sensation    ASSESSMENT:    1. Type 2 diabetes mellitus without complication, without long-term current use of insulin ([facility])     2. Obesity (BMI 30-39.9)     3. Physical deconditioning     4. Rectal pain  oxyCODONE CR (OXYCONTIN) 10 MG Tablet Extended Release 12 hour Abuse-Deterrent    DISCONTINUED: oxyCODONE CR (OXYCONTIN) 10 MG Tablet Extended Release 12 hour Abuse-Deterrent    DISCONTINUED: oxyCODONE " CR (OXYCONTIN) 10 MG Tablet Extended Release 12 hour Abuse-Deterrent   5. Chronic bilateral upper abdominal pain  oxycodone immediate release (ROXICODONE) 10 MG immediate release tablet    DISCONTINUED: oxycodone immediate release (ROXICODONE) 10 MG immediate release tablet    DISCONTINUED: oxycodone immediate release (ROXICODONE) 10 MG immediate release tablet   6. Irritable bowel syndrome with both constipation and diarrhea         PLAN:    Total Face-to-Face time spent with patient: 30 minutes  Amount of time spent counseling patient and/or coordinating care: 20 minutes    The nature of patient counseling as below:  -Patient Education, including below topics:  -Differential Diagnoses and treatment options discussed  -Risks, benefits, alternatives discussed  -Labs reviewed with patient in detail  -Rest/ fluids/ close observation/ OTC medications  -Exercise  -Dietary recommendations discussed  -Weight Loss strategies discussed  -He will contemplate [facility] HOME weight loss program  -Therapeutic Lifestyle Changes discussed    The nature of coordination of care as below:  -Medications added: none  -Medications discontinued: none  -Medications adjusted: refills  -Continue (other) present chronic medications  -Seek medical attention immediately if worse    FOLLOW-UP:  -in 3 months  -and sooner if test/consult results warrant  And for Health Care Maintenance Exams  And as needed.

## 2017-04-24 NOTE — PATIENT INSTRUCTIONS
Current Outpatient Prescriptions Ordered in [facility]   Medication Sig Dispense Refill   • oxyCODONE CR (OXYCONTIN) 10 MG Tablet Extended Release 12 hour Abuse-Deterrent Take 1 Tab by mouth every 12 hours as needed. 60 Each 0   • oxycodone immediate release (ROXICODONE) 10 MG immediate release tablet Take 1 Tab by mouth every 12 hours as needed. 60 Tab 0   • Blood Glucose Monitoring Suppl Device Meter: Dispense Device of Insurance Preference. Sig. Use as directed for blood sugar monitoring. #1. NR. 1 Device 0   • Blood Glucose Monitoring Suppl SUPPLIES Misc Sig: use 3 times/day and prn ssx high or low sugar #300 RF x 3 300 Strip 3   • fluoxetine (PROZAC) 20 MG Cap Take 3 Caps by mouth every day. 270 Cap 4   • mesalamine (LIALDA) 1.2 GM Tablet Delayed Response Take 4 Tabs by mouth every morning with breakfast. 360 Each 4   • metformin (GLUCOPHAGE) 1000 MG tablet Take 1 Tab by mouth 2 times a day. 180 Tab 4   • nateglinide (STARLIX) 120 MG Tab Take 1 Tab by mouth 3 times a day before meals. 270 Tab 4   • simvastatin (ZOCOR) 40 MG Tab Take 1 Tab by mouth every day. 90 Tab 4   • lisinopril (PRINIVIL, ZESTRIL) 40 MG tablet Take 1 Tab by mouth every day. 90 Tab 4   • trazodone (DESYREL) 50 MG Tab TAKE 1/2 TO 1 TABLET BY  MOUTH AT BEDTIME AS NEEDED  FOR SLEEP. 90 Tab 1   • hydrocortisone 2.5 % Cream topical cream Apply 1 Application to affected area(s) 3 times a day. 3 Tube 3   • flunisolide (NASALIDE) 25 MCG/ACT (0.025%) Solution Spray 2 Sprays in nose 2 Times a Day. 3 Bottle 4   • Hydrocortisone-Aloe Vera (GNP HYDROCORTISONE/ALOE) 1 % Cream 1 Application by Apply externally route 2 Times a Day. 1 Tube 0   • FARXIGA 10 MG Tab TAKE 1 EACH BY MOUTH EVERY DAY. 90 Tab 3   • Cholecalciferol (VITAMIN D3) 5000 UNITS Cap Take 1 Cap by mouth every day.     • clotrimazole (LOTRIMIN) 1 % Cream Apply 1 Application to affected area(s) 2 times a day. 1 Tube 0   • imiquimod (ALDARA) 5 % cream apply a thin layer to affected areas before  bedtime 3 times week; wash off 6-10 hours later. 36 Each 4   • methocarbamol (ROBAXIN) 750 MG TABS Take 1 Tab by mouth every 12 hours as needed. 60 Tab 6   • cimetidine (TAGAMET) 200 MG tablet Take 2 Tabs by mouth every day.       No current [facility]-ordered facility-administered medications on file.

## 2017-08-11 DIAGNOSIS — E11.9 TYPE 2 DIABETES MELLITUS WITHOUT COMPLICATION, WITHOUT LONG-TERM CURRENT USE OF INSULIN (HCC): ICD-10-CM

## 2017-08-11 RX ORDER — DAPAGLIFLOZIN 10 MG/1
1 TABLET, FILM COATED ORAL DAILY
Qty: 90 TAB | Refills: 3 | Status: SHIPPED | OUTPATIENT
Start: 2017-08-11 | End: 2017-10-02

## 2017-10-02 ENCOUNTER — OFFICE VISIT (OUTPATIENT)
Dept: INTERNAL MEDICINE | Facility: IMAGING CENTER | Age: 52
End: 2017-10-02
Payer: COMMERCIAL

## 2017-10-02 VITALS
DIASTOLIC BLOOD PRESSURE: 84 MMHG | HEIGHT: 72 IN | RESPIRATION RATE: 14 BRPM | WEIGHT: 246 LBS | SYSTOLIC BLOOD PRESSURE: 118 MMHG | TEMPERATURE: 97.7 F | HEART RATE: 83 BPM | OXYGEN SATURATION: 94 % | BODY MASS INDEX: 33.32 KG/M2

## 2017-10-02 DIAGNOSIS — R10.12 CHRONIC BILATERAL UPPER ABDOMINAL PAIN: ICD-10-CM

## 2017-10-02 DIAGNOSIS — E11.9 TYPE 2 DIABETES MELLITUS WITHOUT COMPLICATION, WITHOUT LONG-TERM CURRENT USE OF INSULIN (HCC): ICD-10-CM

## 2017-10-02 DIAGNOSIS — Z79.891 CHRONIC USE OF OPIATE DRUGS THERAPEUTIC PURPOSES: ICD-10-CM

## 2017-10-02 DIAGNOSIS — J30.1 CHRONIC SEASONAL ALLERGIC RHINITIS DUE TO POLLEN: ICD-10-CM

## 2017-10-02 DIAGNOSIS — E78.2 MIXED DYSLIPIDEMIA: Chronic | ICD-10-CM

## 2017-10-02 DIAGNOSIS — F34.1 DYSTHYMIC DISORDER: ICD-10-CM

## 2017-10-02 DIAGNOSIS — G89.29 CHRONIC BILATERAL UPPER ABDOMINAL PAIN: ICD-10-CM

## 2017-10-02 DIAGNOSIS — E78.5 DYSLIPIDEMIA: ICD-10-CM

## 2017-10-02 DIAGNOSIS — K58.2 IRRITABLE BOWEL SYNDROME WITH BOTH CONSTIPATION AND DIARRHEA: ICD-10-CM

## 2017-10-02 DIAGNOSIS — M50.90 CERVICAL DISC DISEASE: ICD-10-CM

## 2017-10-02 DIAGNOSIS — I10 ESSENTIAL HYPERTENSION, BENIGN: ICD-10-CM

## 2017-10-02 DIAGNOSIS — K62.89 RECTAL PAIN: ICD-10-CM

## 2017-10-02 DIAGNOSIS — E66.9 OBESITY (BMI 30-39.9): ICD-10-CM

## 2017-10-02 DIAGNOSIS — R10.11 CHRONIC BILATERAL UPPER ABDOMINAL PAIN: ICD-10-CM

## 2017-10-02 PROBLEM — R53.81 PHYSICAL DECONDITIONING: Status: RESOLVED | Noted: 2017-04-24 | Resolved: 2017-10-02

## 2017-10-02 LAB
HBA1C MFR BLD: NORMAL % (ref ?–5.8)
INT CON NEG: NEGATIVE
INT CON POS: POSITIVE

## 2017-10-02 PROCEDURE — 99214 OFFICE O/P EST MOD 30 MIN: CPT | Performed by: FAMILY MEDICINE

## 2017-10-02 PROCEDURE — 83036 HEMOGLOBIN GLYCOSYLATED A1C: CPT | Performed by: FAMILY MEDICINE

## 2017-10-02 RX ORDER — NATEGLINIDE 120 MG/1
TABLET ORAL
Qty: 360 TAB | Refills: 4 | Status: SHIPPED | OUTPATIENT
Start: 2017-10-02 | End: 2018-10-13 | Stop reason: SDUPTHER

## 2017-10-02 RX ORDER — OXYCODONE HCL 10 MG/1
10 TABLET, FILM COATED, EXTENDED RELEASE ORAL EVERY 12 HOURS PRN
Qty: 60 EACH | Refills: 0 | Status: SHIPPED | OUTPATIENT
Start: 2017-10-02 | End: 2018-02-27 | Stop reason: SDUPTHER

## 2017-10-02 RX ORDER — OXYCODONE HYDROCHLORIDE 10 MG/1
10 TABLET ORAL EVERY 12 HOURS PRN
Qty: 60 TAB | Refills: 0 | Status: SHIPPED | OUTPATIENT
Start: 2017-10-02 | End: 2017-10-02 | Stop reason: SDUPTHER

## 2017-10-02 RX ORDER — FLUNISOLIDE 0.25 MG/ML
2 SOLUTION NASAL 2 TIMES DAILY
Qty: 3 BOTTLE | Refills: 4 | Status: SHIPPED | OUTPATIENT
Start: 2017-10-02 | End: 2018-08-27 | Stop reason: SDUPTHER

## 2017-10-02 RX ORDER — MESALAMINE 1.2 G/1
4.8 TABLET, DELAYED RELEASE ORAL
Qty: 360 EACH | Refills: 4 | Status: SHIPPED | OUTPATIENT
Start: 2017-10-02 | End: 2019-02-15 | Stop reason: SDUPTHER

## 2017-10-02 RX ORDER — METFORMIN HYDROCHLORIDE 500 MG/1
1000 TABLET, EXTENDED RELEASE ORAL 2 TIMES DAILY
Qty: 360 TAB | Refills: 4 | Status: SHIPPED | OUTPATIENT
Start: 2017-10-02 | End: 2019-05-03 | Stop reason: SDUPTHER

## 2017-10-02 RX ORDER — FLUOXETINE HYDROCHLORIDE 20 MG/1
60 CAPSULE ORAL DAILY
Qty: 270 CAP | Refills: 4 | Status: SHIPPED | OUTPATIENT
Start: 2017-10-02 | End: 2019-11-18

## 2017-10-02 RX ORDER — SIMVASTATIN 40 MG
40 TABLET ORAL EVERY EVENING
Qty: 90 TAB | Refills: 4 | Status: SHIPPED | OUTPATIENT
Start: 2017-10-02 | End: 2018-10-27 | Stop reason: SDUPTHER

## 2017-10-02 RX ORDER — OXYCODONE HYDROCHLORIDE 10 MG/1
10 TABLET ORAL EVERY 12 HOURS PRN
Qty: 60 TAB | Refills: 0 | Status: SHIPPED | OUTPATIENT
Start: 2017-10-02 | End: 2018-02-27 | Stop reason: SDUPTHER

## 2017-10-02 RX ORDER — OXYCODONE HCL 10 MG/1
10 TABLET, FILM COATED, EXTENDED RELEASE ORAL EVERY 12 HOURS PRN
Qty: 60 EACH | Refills: 0 | Status: SHIPPED | OUTPATIENT
Start: 2017-10-02 | End: 2017-10-02 | Stop reason: SDUPTHER

## 2017-10-02 RX ORDER — METHOCARBAMOL 750 MG/1
750 TABLET, FILM COATED ORAL EVERY 12 HOURS PRN
Qty: 60 TAB | Refills: 6 | Status: SHIPPED | OUTPATIENT
Start: 2017-10-02 | End: 2018-08-27 | Stop reason: SDUPTHER

## 2017-10-02 RX ORDER — LISINOPRIL 40 MG/1
40 TABLET ORAL DAILY
Qty: 90 TAB | Refills: 4 | Status: SHIPPED | OUTPATIENT
Start: 2017-10-02 | End: 2018-10-27 | Stop reason: SDUPTHER

## 2017-10-02 ASSESSMENT — LIFESTYLE VARIABLES: HISTORY_ALCOHOL_USE: 0

## 2017-10-02 ASSESSMENT — ENCOUNTER SYMPTOMS: DEPRESSION: 1

## 2017-10-02 NOTE — PROGRESS NOTES
Chief Complaint   Patient presents with   • Non Insulin Dependent Diabetes       HPI:  Patient is a 52 y.o. male established patient who presents today to transfer care from Dr. Wise to myself and to obtain refills for his medications. He has chronic uncontrolled DMII and does not want insulin therapy to improve his condition. He is well aware of the myriad of health issues associated with DM and has a significant family history of cardiovascular disease/death. He freely admits to having average BG 250s-400s, does not exercise, does not follow ADA diet, and has refused retinal exam and all vaccines, now and in the past. He also has chronic IBS, with both diarrhea and constipation features, managed with chronic narcotics, Liadla, asparagus and Activa yogurt. He is currently having 4-5 formed BMs per day at this time. He also suffers from chronic obesity and chronic essential HTN controlled on daily lisinopril. He also has chronic cervical spine disease with spasm controlled with PRN robaxin use. 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 He is in good spirits today overall.     Patient Active Problem List    Diagnosis Date Noted   • Mixed dyslipidemia 10/02/2017   • Chronic use of opiate for therapeutic purpose 10/02/2017   • Irritable bowel syndrome with both constipation and diarrhea 04/24/2017   • Chronic bilateral upper abdominal pain 04/24/2017   • Type 2 diabetes mellitus without complication, without long-term current use of insulin (HCC) 04/24/2017   • Essential hypertension, benign 05/06/2014   • Obesity (BMI 30-39.9) 05/06/2014   • Rectal pain 04/03/2014   • Former smoker 11/22/2013   • Dysthymic disorder 04/19/2013   • Cervical disc disease 04/19/2013   • Male hypogonadism 04/19/2013   • IBD (inflammatory bowel disease)      Past medical, surgical, family, and social history was reviewed and updated in Epic chart by me today.     Medications and  "allergies reviewed and updated in Epic chart by me today.     ROS:  Pertinent positives listed above in HPI. All other systems have been reviewed and are negative.    PE:   /84   Pulse 83   Temp 36.5 °C (97.7 °F)   Resp 14   Ht 1.829 m (6' 0.01\")   Wt 111.6 kg (246 lb)   SpO2 94%   BMI 33.36 kg/m²   Vital signs reviewed with patient.     Gen: Well developed; well nourished; no acute distress; age appropriate appearance   HEENT: Normocephalic; atraumatic; PEERLA b/l; sclera clear b/l; b/l external auditory canals WNL; b/l TM WNL; oropharynx clear; oral mucosa moist; tongue midline; dentition adequate   Neck: No adenopathy; no thyromegaly  CV: Regular rate and rhythm; S1/ S2 present; no murmur, gallop or rub noted  Pulm: No respiratory distress; clear to ascultation b/l; no wheezing or stridor noted b/l  Abd: Adequate bowel sounds noted; soft and nontender; no rebound, rigidity, nor distention; obese  Extremities: No peripheral edema b/l LE extremities/ no clubbing nor cyanosis noted  Skin: Warm and dry; no rashes noted   Neuro: No focal deficits noted   Psych: AAOx4; mood and affect are appropriate  B/l monofilament exam done today with no deficits noted; excellent foot and toenail health noted b/l    A/P:  1. Chronic irritable bowel syndrome with both constipation and diarrhea  Stable/ well controlled/ pt to continue current daily medications at this time.   - CONTROLLED SUBSTANCE TREATMENT AGREEMENT  - [facility] PAIN MANAGEMENT SCREEN; Future  - oxycodone immediate release (ROXICODONE) 10 MG immediate release tablet; Take 1 Tab by mouth every 12 hours as needed.  Dispense: 60 Tab; Refill: 0  - oxyCODONE CR (OXYCONTIN) 10 MG Tablet Extended Release 12 hour Abuse-Deterrent; Take 1 Tab by mouth every 12 hours as needed.  Dispense: 60 Each; Refill: 0  - mesalamine (LIALDA) 1.2 GM Tablet Delayed Response; Take 4 Tabs by mouth every morning with breakfast.  Dispense: 360 Each; Refill: 4    2. Chronic " bilateral upper abdominal pain  Stable/ well controlled/ pt to continue current daily medications  - CONTROLLED SUBSTANCE TREATMENT AGREEMENT  - [facility] PAIN MANAGEMENT SCREEN; Future  - oxycodone immediate release (ROXICODONE) 10 MG immediate release tablet; Take 1 Tab by mouth every 12 hours as needed.  Dispense: 60 Tab; Refill: 0  - oxyCODONE CR (OXYCONTIN) 10 MG Tablet Extended Release 12 hour Abuse-Deterrent; Take 1 Tab by mouth every 12 hours as needed.  Dispense: 60 Each; Refill: 0    3. Chronic rectal pain  Stable/ well controlled/ pt to continue current daily medications  - CONTROLLED SUBSTANCE TREATMENT AGREEMENT  - [facility] PAIN MANAGEMENT SCREEN; Future  - oxycodone immediate release (ROXICODONE) 10 MG immediate release tablet; Take 1 Tab by mouth every 12 hours as needed.  Dispense: 60 Tab; Refill: 0  - oxyCODONE CR (OXYCONTIN) 10 MG Tablet Extended Release 12 hour Abuse-Deterrent; Take 1 Tab by mouth every 12 hours as needed.  Dispense: 60 Each; Refill: 0    4. Chronic use of opiate drugs therapeutic purposes  CSA/drug screen updated today/  reviewed today with no abnormalities noted.   - CONTROLLED SUBSTANCE TREATMENT AGREEMENT  - [facility] PAIN MANAGEMENT SCREEN; Future  - oxycodone immediate release (ROXICODONE) 10 MG immediate release tablet; Take 1 Tab by mouth every 12 hours as needed.  Dispense: 60 Tab; Refill: 0  - oxyCODONE CR (OXYCONTIN) 10 MG Tablet Extended Release 12 hour Abuse-Deterrent; Take 1 Tab by mouth every 12 hours as needed.  Dispense: 60 Each; Refill: 0    5. Chronic obesity (BMI 30-39.9)  Uncontrolled/ pt knows that he needs to exercise daily and does not want to at this time.   - Patient identified as having weight management issue.  Appropriate orders and counseling given.    6. Chronic type 2 diabetes mellitus without complication, without long-term current use of insulin (HCC)  Uncontrolled/ HgA1c 10.7 today. Pt is not interested in injectable medication which is  the absolute appropriate next step for treatment. He is willing to maximize his current oral medications. He is non compliant with ADA diet, exercise, and retinal exam related to DM and is very well versed about the potential risks/ complications that his current behaviors can lead to.   - metformin ER (GLUCOPHAGE XR) 500 MG TABLET SR 24 HR; Take 2 Tabs by mouth 2 times a day.  Dispense: 360 Tab; Refill: 4  - nateglinide (STARLIX) 120 MG Tab; Take one tablet by mouth before each meal and before bed daily.  Dispense: 360 Tab; Refill: 4  - POCT A1C  - Diabetic Monofilament LE Exam    7. Chronic dysthymic disorder  Stable/ pt to continue current prozac dose daily/ no safety concerns noted.  - fluoxetine (PROZAC) 20 MG Cap; Take 3 Caps by mouth every day.  Dispense: 270 Cap; Refill: 4    8. Mixed dyslipidemia  Stable/ pt to continue daily statin/ lipid panel due 1/18  - simvastatin (ZOCOR) 40 MG Tab; Take 1 Tab by mouth every evening.  Dispense: 90 Tab; Refill: 4    9. Chronic cervical disc disease  Stable/ symptoms controlled with PRN robaxin use  - methocarbamol (ROBAXIN) 750 MG Tab; Take 1 Tab by mouth every 12 hours as needed.  Dispense: 60 Tab; Refill: 6    10. Chronic essential hypertension, benign  Stable/ pt to continue daily lisinopril.   - lisinopril (PRINIVIL, ZESTRIL) 40 MG tablet; Take 1 Tab by mouth every day.  Dispense: 90 Tab; Refill: 4     All medication refills sent to his pharmacy in [place], [place] per his request. Due to his ongoing non compliance with optimal disease management, he is high risk for medical complications, and he is well aware of this potential. I would like to see him in January 2018 for annual fasting labs/ repeat HgA1c/ medication recheck. He is to call me 24/7 if he has any issues with medication changes/ changes in his overall condition.

## 2018-02-27 ENCOUNTER — HOSPITAL ENCOUNTER (OUTPATIENT)
Facility: MEDICAL CENTER | Age: 53
End: 2018-02-27
Attending: FAMILY MEDICINE
Payer: COMMERCIAL

## 2018-02-27 ENCOUNTER — OFFICE VISIT (OUTPATIENT)
Dept: INTERNAL MEDICINE | Facility: IMAGING CENTER | Age: 53
End: 2018-02-27
Payer: COMMERCIAL

## 2018-02-27 VITALS
BODY MASS INDEX: 33.32 KG/M2 | SYSTOLIC BLOOD PRESSURE: 138 MMHG | DIASTOLIC BLOOD PRESSURE: 80 MMHG | RESPIRATION RATE: 14 BRPM | WEIGHT: 246 LBS | OXYGEN SATURATION: 95 % | HEART RATE: 80 BPM | HEIGHT: 72 IN | TEMPERATURE: 97.9 F

## 2018-02-27 DIAGNOSIS — M50.90 CERVICAL DISC DISEASE: Chronic | ICD-10-CM

## 2018-02-27 DIAGNOSIS — B07.8 OTHER VIRAL WARTS: ICD-10-CM

## 2018-02-27 DIAGNOSIS — Z79.891 CHRONIC USE OF OPIATE DRUGS THERAPEUTIC PURPOSES: ICD-10-CM

## 2018-02-27 DIAGNOSIS — Z12.5 SCREENING FOR PROSTATE CANCER: ICD-10-CM

## 2018-02-27 DIAGNOSIS — Z00.00 HEALTH CARE MAINTENANCE: ICD-10-CM

## 2018-02-27 DIAGNOSIS — R10.11 CHRONIC BILATERAL UPPER ABDOMINAL PAIN: Chronic | ICD-10-CM

## 2018-02-27 DIAGNOSIS — I10 ESSENTIAL HYPERTENSION, BENIGN: Chronic | ICD-10-CM

## 2018-02-27 DIAGNOSIS — R10.12 CHRONIC BILATERAL UPPER ABDOMINAL PAIN: Chronic | ICD-10-CM

## 2018-02-27 DIAGNOSIS — E11.9 TYPE 2 DIABETES MELLITUS WITHOUT COMPLICATION, WITHOUT LONG-TERM CURRENT USE OF INSULIN (HCC): Chronic | ICD-10-CM

## 2018-02-27 DIAGNOSIS — E78.2 MIXED DYSLIPIDEMIA: Chronic | ICD-10-CM

## 2018-02-27 DIAGNOSIS — E66.9 OBESITY (BMI 30-39.9): Chronic | ICD-10-CM

## 2018-02-27 DIAGNOSIS — G89.29 CHRONIC BILATERAL UPPER ABDOMINAL PAIN: Chronic | ICD-10-CM

## 2018-02-27 DIAGNOSIS — F34.1 DYSTHYMIC DISORDER: Chronic | ICD-10-CM

## 2018-02-27 DIAGNOSIS — Z79.891 CHRONIC USE OF OPIATE FOR THERAPEUTIC PURPOSE: Chronic | ICD-10-CM

## 2018-02-27 DIAGNOSIS — K58.2 IRRITABLE BOWEL SYNDROME WITH BOTH CONSTIPATION AND DIARRHEA: ICD-10-CM

## 2018-02-27 DIAGNOSIS — K62.89 RECTAL PAIN: Chronic | ICD-10-CM

## 2018-02-27 LAB
25(OH)D3 SERPL-MCNC: 49 NG/ML (ref 30–100)
ALBUMIN SERPL BCP-MCNC: 4.7 G/DL (ref 3.2–4.9)
ALBUMIN/GLOB SERPL: 1.7 G/DL
ALP SERPL-CCNC: 66 U/L (ref 30–99)
ALT SERPL-CCNC: 26 U/L (ref 2–50)
ANION GAP SERPL CALC-SCNC: 11 MMOL/L (ref 0–11.9)
AST SERPL-CCNC: 13 U/L (ref 12–45)
BASOPHILS # BLD AUTO: 0.5 % (ref 0–1.8)
BASOPHILS # BLD: 0.04 K/UL (ref 0–0.12)
BILIRUB SERPL-MCNC: 0.4 MG/DL (ref 0.1–1.5)
BUN SERPL-MCNC: 14 MG/DL (ref 8–22)
CALCIUM SERPL-MCNC: 9.6 MG/DL (ref 8.5–10.5)
CHLORIDE SERPL-SCNC: 100 MMOL/L (ref 96–112)
CHOLEST SERPL-MCNC: 193 MG/DL (ref 100–199)
CO2 SERPL-SCNC: 25 MMOL/L (ref 20–33)
CREAT SERPL-MCNC: 0.91 MG/DL (ref 0.5–1.4)
CREAT UR-MCNC: 46.1 MG/DL
EOSINOPHIL # BLD AUTO: 0.13 K/UL (ref 0–0.51)
EOSINOPHIL NFR BLD: 1.8 % (ref 0–6.9)
ERYTHROCYTE [DISTWIDTH] IN BLOOD BY AUTOMATED COUNT: 41.5 FL (ref 35.9–50)
GLOBULIN SER CALC-MCNC: 2.8 G/DL (ref 1.9–3.5)
GLUCOSE SERPL-MCNC: 295 MG/DL (ref 65–99)
HBA1C MFR BLD: 10.6 % (ref ?–5.8)
HCT VFR BLD AUTO: 43.4 % (ref 42–52)
HDLC SERPL-MCNC: 60 MG/DL
HGB BLD-MCNC: 14.7 G/DL (ref 14–18)
IMM GRANULOCYTES # BLD AUTO: 0.04 K/UL (ref 0–0.11)
IMM GRANULOCYTES NFR BLD AUTO: 0.5 % (ref 0–0.9)
INT CON NEG: NEGATIVE
INT CON POS: POSITIVE
LDLC SERPL CALC-MCNC: 115 MG/DL
LYMPHOCYTES # BLD AUTO: 2.51 K/UL (ref 1–4.8)
LYMPHOCYTES NFR BLD: 34 % (ref 22–41)
MCH RBC QN AUTO: 29 PG (ref 27–33)
MCHC RBC AUTO-ENTMCNC: 33.9 G/DL (ref 33.7–35.3)
MCV RBC AUTO: 85.6 FL (ref 81.4–97.8)
MICROALBUMIN UR-MCNC: <0.7 MG/DL
MICROALBUMIN/CREAT UR: NORMAL MG/G (ref 0–30)
MONOCYTES # BLD AUTO: 0.58 K/UL (ref 0–0.85)
MONOCYTES NFR BLD AUTO: 7.9 % (ref 0–13.4)
NEUTROPHILS # BLD AUTO: 4.08 K/UL (ref 1.82–7.42)
NEUTROPHILS NFR BLD: 55.3 % (ref 44–72)
NRBC # BLD AUTO: 0 K/UL
NRBC BLD-RTO: 0 /100 WBC
PLATELET # BLD AUTO: 245 K/UL (ref 164–446)
PMV BLD AUTO: 11.7 FL (ref 9–12.9)
POTASSIUM SERPL-SCNC: 4.2 MMOL/L (ref 3.6–5.5)
PROT SERPL-MCNC: 7.5 G/DL (ref 6–8.2)
PSA SERPL-MCNC: 0.24 NG/ML (ref 0–4)
RBC # BLD AUTO: 5.07 M/UL (ref 4.7–6.1)
SODIUM SERPL-SCNC: 136 MMOL/L (ref 135–145)
TRIGL SERPL-MCNC: 90 MG/DL (ref 0–149)
TSH SERPL DL<=0.005 MIU/L-ACNC: 1.39 UIU/ML (ref 0.38–5.33)
WBC # BLD AUTO: 7.4 K/UL (ref 4.8–10.8)

## 2018-02-27 PROCEDURE — 82570 ASSAY OF URINE CREATININE: CPT

## 2018-02-27 PROCEDURE — 84153 ASSAY OF PSA TOTAL: CPT

## 2018-02-27 PROCEDURE — 82306 VITAMIN D 25 HYDROXY: CPT

## 2018-02-27 PROCEDURE — 80053 COMPREHEN METABOLIC PANEL: CPT

## 2018-02-27 PROCEDURE — 80061 LIPID PANEL: CPT

## 2018-02-27 PROCEDURE — 83036 HEMOGLOBIN GLYCOSYLATED A1C: CPT | Performed by: FAMILY MEDICINE

## 2018-02-27 PROCEDURE — 99214 OFFICE O/P EST MOD 30 MIN: CPT | Performed by: FAMILY MEDICINE

## 2018-02-27 PROCEDURE — 85025 COMPLETE CBC W/AUTO DIFF WBC: CPT

## 2018-02-27 PROCEDURE — 84443 ASSAY THYROID STIM HORMONE: CPT

## 2018-02-27 PROCEDURE — 82043 UR ALBUMIN QUANTITATIVE: CPT

## 2018-02-27 RX ORDER — OXYCODONE HCL 10 MG/1
10 TABLET, FILM COATED, EXTENDED RELEASE ORAL EVERY 12 HOURS PRN
Qty: 60 EACH | Refills: 0 | Status: SHIPPED | OUTPATIENT
Start: 2018-04-30 | End: 2018-05-30

## 2018-02-27 RX ORDER — OXYCODONE HYDROCHLORIDE 10 MG/1
10 TABLET ORAL EVERY 12 HOURS PRN
Qty: 60 TAB | Refills: 0 | Status: SHIPPED | OUTPATIENT
Start: 2018-04-30 | End: 2018-05-30

## 2018-02-27 RX ORDER — IMIQUIMOD 12.5 MG/.25G
CREAM TOPICAL
Qty: 36 EACH | Refills: 4 | Status: SHIPPED | OUTPATIENT
Start: 2018-02-27 | End: 2024-01-02 | Stop reason: SDUPTHER

## 2018-02-27 RX ORDER — OXYCODONE HYDROCHLORIDE 10 MG/1
10 TABLET ORAL EVERY 12 HOURS PRN
Qty: 60 TAB | Refills: 0 | Status: SHIPPED | OUTPATIENT
Start: 2018-03-30 | End: 2018-02-27 | Stop reason: SDUPTHER

## 2018-02-27 RX ORDER — FLUOXETINE HYDROCHLORIDE 20 MG/1
CAPSULE ORAL
Qty: 360 CAP | Refills: 4 | Status: SHIPPED | OUTPATIENT
Start: 2018-02-27 | End: 2018-08-27

## 2018-02-27 RX ORDER — OXYCODONE HYDROCHLORIDE 10 MG/1
10 TABLET ORAL EVERY 12 HOURS PRN
Qty: 60 TAB | Refills: 0 | Status: SHIPPED | OUTPATIENT
Start: 2018-02-27 | End: 2018-02-27 | Stop reason: SDUPTHER

## 2018-02-27 RX ORDER — OXYCODONE HCL 10 MG/1
10 TABLET, FILM COATED, EXTENDED RELEASE ORAL EVERY 12 HOURS PRN
Qty: 60 EACH | Refills: 0 | Status: SHIPPED | OUTPATIENT
Start: 2018-03-30 | End: 2018-02-27 | Stop reason: SDUPTHER

## 2018-02-27 RX ORDER — OXYCODONE HCL 10 MG/1
10 TABLET, FILM COATED, EXTENDED RELEASE ORAL EVERY 12 HOURS PRN
Qty: 60 EACH | Refills: 0 | Status: SHIPPED | OUTPATIENT
Start: 2018-02-27 | End: 2018-02-27 | Stop reason: SDUPTHER

## 2018-02-27 ASSESSMENT — PATIENT HEALTH QUESTIONNAIRE - PHQ9: CLINICAL INTERPRETATION OF PHQ2 SCORE: 0

## 2018-02-27 NOTE — PROGRESS NOTES
Chief Complaint   Patient presents with   • Non Insulin Dependent Diabetes   • Irritable Bowel Syndrome       HPI:  Patient is a 52 y.o. male established patient who presents today for follow-up on chronic, uncontrolled DMII and chronic IBS. 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 He continues to be marginally compliant with all aspects of his health care and freely admits to poor diet and lack of exercise. He has chronic uncontrolled DMII and is not interested in medication changes or improving his HgA1c. He also suffers from chronic IBS with associated chronic abdominal/rectal pain controlled with chronic Oxycontin and Roxicodone.  Pt reports that his pain is adequately controlled on Oxycontin and Roxicodone and activity is improved with medication use. He does not exercise regularly and denies side effects related to narcotic use . He continues to take medication as prescribed and is not veering from agreed treatment regimen. Pt has failed non narcotic treatment options in the past and benefits from daily narcotic use. He does have a history of depression but not anxiety. Treatment goals discussed today and Opioid risk score is 1. His drug tox screen is UTD and consistent with reported medication use. I have reviewed his medical records and  today, and have determined that controlled substance treatment is medically indicated at this time. He also has chronic viral warts and would like refill for Aldara sent to [place] pharmacy. He reports that his other chronic medical conditions are stable at this time.      Patient Active Problem List    Diagnosis Date Noted   • Mixed dyslipidemia 10/02/2017   • Chronic use of opiate for therapeutic purpose 10/02/2017   • Irritable bowel syndrome with both constipation and diarrhea 04/24/2017   • Chronic bilateral upper abdominal pain 04/24/2017   • Type 2 diabetes mellitus without  "complication, without long-term current use of insulin (HCC) 04/24/2017   • Essential hypertension, benign 05/06/2014   • Obesity (BMI 30-39.9) 05/06/2014   • Rectal pain 04/03/2014   • Former smoker 11/22/2013   • Dysthymic disorder 04/19/2013   • Cervical disc disease 04/19/2013   • Male hypogonadism 04/19/2013   • IBD (inflammatory bowel disease)      Past medical, surgical, family, and social history was reviewed in [facility] chart by me today.     Medications and allergies reviewed and updated in Epic chart by me today.     ROS:  Pertinent positives listed above in HPI. All other systems have been reviewed and are negative.    PE:   /80   Pulse 80   Temp 36.6 °C (97.9 °F)   Resp 14   Ht 1.829 m (6' 0.01\")   Wt 111.6 kg (246 lb)   SpO2 95%   BMI 33.36 kg/m²   Vital signs reviewed with patient.     Gen: Well developed; well nourished; no acute distress; age appropriate appearance   HEENT: Normocephalic; atraumatic; PEERLA b/l; sclera clear b/l; b/l external auditory canals WNL; b/l TM WNL; nares patent b/l; oropharynx clear; oral mucosa moist; tongue midline; dentition adequate   Neck: No adenopathy; no thyromegaly  CV: Regular rate and rhythm; S1/ S2 present; no murmur, gallop or rub noted  Pulm: No respiratory distress; clear to ascultation b/l; no wheezing or stridor noted b/l  Abd: Adequate bowel sounds noted; soft and nontender; no rebound, rigidity, nor distention; obese  Extremities: No peripheral edema b/l LE extremities/ no clubbing nor cyanosis noted  Skin: Warm and dry; no rashes noted   Neuro: No focal deficits noted   Psych: AAOx4; mood and affect are baseline    A/P:  1. Chronic bilateral upper abdominal pain  Stable/ controlled with chronic pain medication use/ Consent signed today and  reviewed without any issues noted. Pt to continue current pain medication routine. Pt is well versed in safe use of narcotic medications and has no perceived side effects.   - Consent for Opiate " Prescription  - oxyCODONE CR (OXYCONTIN) 10 MG Tablet Extended Release 12 hour Abuse-Deterrent; Take 1 Tab by mouth every 12 hours as needed for up to 30 days.  Dispense: 60 Each; Refill: 0  - oxyCODONE immediate release (ROXICODONE) 10 MG immediate release tablet; Take 1 Tab by mouth every 12 hours as needed for up to 30 days.  Dispense: 60 Tab; Refill: 0    2. Chronic use of opiate for therapeutic purpose  Stable/ controlled with chronic pain medication use/ Consent signed today and  reviewed without any issues noted. Pt to continue current pain medication routine. Pt is well versed in safe use of narcotic medications and has no perceived side effects.   - Consent for Opiate Prescription  - oxyCODONE CR (OXYCONTIN) 10 MG Tablet Extended Release 12 hour Abuse-Deterrent; Take 1 Tab by mouth every 12 hours as needed for up to 30 days.  Dispense: 60 Each; Refill: 0  - oxyCODONE immediate release (ROXICODONE) 10 MG immediate release tablet; Take 1 Tab by mouth every 12 hours as needed for up to 30 days.  Dispense: 60 Tab; Refill: 0    3. Chronic dysthymic disorder  Uncontrolled/ pt does not want to discontinue prozac and trial another medication. He uses Prozac for both dysthymia and IBS issues. Will increase Prozac dose to 80 mg daily and follow response. No safety concerns noted and patient would rather not take any medications at all. RX sent to [place] pharmacy per patient request, and he would like 20 mg capsules for cost purposes.   - FLUoxetine (PROZAC) 20 MG Cap; Take four capsules by mouth daily.  Dispense: 360 Cap; Refill: 4    4. Chronic rectal pain  Stable/ controlled with chronic pain medication use/ Consent signed today and  reviewed without any issues noted. Pt to continue current pain medication routine. Pt is well versed in safe use of narcotic medications and has no perceived side effects.   - Consent for Opiate Prescription  - oxyCODONE CR (OXYCONTIN) 10 MG Tablet Extended Release 12 hour  Abuse-Deterrent; Take 1 Tab by mouth every 12 hours as needed for up to 30 days.  Dispense: 60 Each; Refill: 0  - oxyCODONE immediate release (ROXICODONE) 10 MG immediate release tablet; Take 1 Tab by mouth every 12 hours as needed for up to 30 days.  Dispense: 60 Tab; Refill: 0    5. Chronic cervical disc disease  Stable/ no current flare reported    6. Chronic irritable bowel syndrome with both constipation and diarrhea  Stable/ controlled with chronic pain medication use/ Consent signed today and  reviewed without any issues noted. Pt to continue current pain medication routine. Pt is well versed in safe use of narcotic medications and has no perceived side effects.   - Consent for Opiate Prescription  - oxyCODONE CR (OXYCONTIN) 10 MG Tablet Extended Release 12 hour Abuse-Deterrent; Take 1 Tab by mouth every 12 hours as needed for up to 30 days.  Dispense: 60 Each; Refill: 0  - oxyCODONE immediate release (ROXICODONE) 10 MG immediate release tablet; Take 1 Tab by mouth every 12 hours as needed for up to 30 days.  Dispense: 60 Tab; Refill: 0    7. Chronic essential hypertension, benign  Stable/ pt to continue daily ACE use    8. Chronic mixed dyslipidemia  Stable/ pt to continue daily statin/ due for fasting lipid panel today.   - LIPID PROFILE; Future    9. Chronic type 2 diabetes mellitus without complication, without long-term current use of insulin (HCC)  Uncontrolled chronically/ POCT HgA1c 10.6% today/ pt is not interested in changing medications nor changing his ongoing poor diet choices. He is aware of risks associated with these choices and is due today for urine sample and CMP.   - MICROALBUMIN CREAT RATIO URINE (CLINIC COLLECT); Future  - COMP METABOLIC PANEL; Future    10. Chronic obesity (BMI 30-39.9)  Due for thyroid labs today. Pt does not adhere to diet nor regular exercise plan and encouragement provided today.   - TSH WITH REFLEX TO FT4; Future    11. Screening for prostate cancer  Due for PSA  today.  - PROSTATE SPECIFIC AG SCREENING; Future    12. Health care maintenance  Due for fasting labs today at visit.   - CBC WITH DIFFERENTIAL; Future  - VITAMIN D,25 HYDROXY; Future    13. Other viral warts  Stable/ pt requesting Aldara refill sent to his [place] pharmacy for PRN use.   - imiquimod (ALDARA) 5 % cream; apply a thin layer to affected areas before bedtime 3 times week; wash off 6-10 hours later.  Dispense: 36 Each; Refill: 4     Pt remains marginally compliant with all facets of his health care and remains at risk for adverse events - encouragement provided to patient today to increase attention to diet and exercise as able. Pt is to return in 3 months for medication refills/ PRN sooner if current condition changes.

## 2018-07-27 RX ORDER — DAPAGLIFLOZIN 10 MG/1
1 TABLET, FILM COATED ORAL DAILY
Qty: 30 TAB | Refills: 0 | Status: SHIPPED | OUTPATIENT
Start: 2018-07-27 | End: 2018-08-28 | Stop reason: SDUPTHER

## 2018-07-27 RX ORDER — DAPAGLIFLOZIN 10 MG/1
10 TABLET, FILM COATED ORAL DAILY
Qty: 90 TAB | Refills: 3 | OUTPATIENT
Start: 2018-07-27

## 2018-08-27 ENCOUNTER — OFFICE VISIT (OUTPATIENT)
Dept: INTERNAL MEDICINE | Facility: IMAGING CENTER | Age: 53
End: 2018-08-27
Payer: COMMERCIAL

## 2018-08-27 VITALS
DIASTOLIC BLOOD PRESSURE: 64 MMHG | TEMPERATURE: 97.9 F | SYSTOLIC BLOOD PRESSURE: 128 MMHG | HEIGHT: 72 IN | OXYGEN SATURATION: 95 % | HEART RATE: 84 BPM | BODY MASS INDEX: 34.67 KG/M2 | RESPIRATION RATE: 14 BRPM | WEIGHT: 256 LBS

## 2018-08-27 DIAGNOSIS — K58.2 IRRITABLE BOWEL SYNDROME WITH BOTH CONSTIPATION AND DIARRHEA: Chronic | ICD-10-CM

## 2018-08-27 DIAGNOSIS — M50.90 CERVICAL DISC DISEASE: Chronic | ICD-10-CM

## 2018-08-27 DIAGNOSIS — E11.9 TYPE 2 DIABETES MELLITUS WITHOUT COMPLICATION, WITHOUT LONG-TERM CURRENT USE OF INSULIN (HCC): Chronic | ICD-10-CM

## 2018-08-27 DIAGNOSIS — L57.0 ACTINIC KERATOSIS: ICD-10-CM

## 2018-08-27 DIAGNOSIS — H10.89 OTHER CONJUNCTIVITIS OF BOTH EYES: ICD-10-CM

## 2018-08-27 DIAGNOSIS — Z71.85 VACCINE COUNSELING: ICD-10-CM

## 2018-08-27 DIAGNOSIS — J30.1 CHRONIC SEASONAL ALLERGIC RHINITIS DUE TO POLLEN: ICD-10-CM

## 2018-08-27 DIAGNOSIS — F11.20 CHRONIC NARCOTIC DEPENDENCE (HCC): ICD-10-CM

## 2018-08-27 DIAGNOSIS — E66.9 OBESITY (BMI 30-39.9): Chronic | ICD-10-CM

## 2018-08-27 LAB
HBA1C MFR BLD: 8.5 % (ref ?–5.8)
INT CON NEG: NEGATIVE
INT CON POS: POSITIVE

## 2018-08-27 PROCEDURE — 17000 DESTRUCT PREMALG LESION: CPT | Performed by: FAMILY MEDICINE

## 2018-08-27 PROCEDURE — 83036 HEMOGLOBIN GLYCOSYLATED A1C: CPT | Performed by: FAMILY MEDICINE

## 2018-08-27 PROCEDURE — 99214 OFFICE O/P EST MOD 30 MIN: CPT | Mod: 25 | Performed by: FAMILY MEDICINE

## 2018-08-27 RX ORDER — OXYCODONE HCL 10 MG/1
10 TABLET, FILM COATED, EXTENDED RELEASE ORAL EVERY 12 HOURS
Qty: 60 TAB | Refills: 0 | Status: SHIPPED | OUTPATIENT
Start: 2018-10-26 | End: 2018-11-25

## 2018-08-27 RX ORDER — OXYCODONE HYDROCHLORIDE 10 MG/1
10 TABLET ORAL 2 TIMES DAILY PRN
Qty: 60 TAB | Refills: 0 | Status: SHIPPED | OUTPATIENT
Start: 2018-10-26 | End: 2018-11-25

## 2018-08-27 RX ORDER — OXYCODONE HCL 10 MG/1
10 TABLET, FILM COATED, EXTENDED RELEASE ORAL EVERY 12 HOURS
Qty: 60 TAB | Refills: 0 | Status: SHIPPED | OUTPATIENT
Start: 2018-08-27 | End: 2018-08-27 | Stop reason: SDUPTHER

## 2018-08-27 RX ORDER — OXYCODONE HYDROCHLORIDE 10 MG/1
10 TABLET ORAL 2 TIMES DAILY PRN
Qty: 60 TAB | Refills: 0 | Status: SHIPPED | OUTPATIENT
Start: 2018-09-26 | End: 2018-08-27 | Stop reason: SDUPTHER

## 2018-08-27 RX ORDER — OXYCODONE HYDROCHLORIDE 10 MG/1
10 TABLET ORAL 2 TIMES DAILY PRN
Qty: 60 TAB | Refills: 0 | Status: SHIPPED | OUTPATIENT
Start: 2018-08-27 | End: 2018-08-27 | Stop reason: SDUPTHER

## 2018-08-27 RX ORDER — OXYCODONE HCL 10 MG/1
10 TABLET, FILM COATED, EXTENDED RELEASE ORAL EVERY 12 HOURS
Qty: 60 TAB | Refills: 0 | Status: SHIPPED | OUTPATIENT
Start: 2018-09-26 | End: 2018-08-27 | Stop reason: SDUPTHER

## 2018-08-27 RX ORDER — METHOCARBAMOL 750 MG/1
750 TABLET, FILM COATED ORAL EVERY 12 HOURS PRN
Qty: 60 TAB | Refills: 6 | Status: SHIPPED | OUTPATIENT
Start: 2018-08-27 | End: 2020-08-13

## 2018-08-27 RX ORDER — FLUNISOLIDE 0.25 MG/ML
2 SOLUTION NASAL 2 TIMES DAILY
Qty: 3 BOTTLE | Refills: 4 | Status: SHIPPED | OUTPATIENT
Start: 2018-08-27 | End: 2022-02-15

## 2018-08-27 RX ORDER — CIPROFLOXACIN HYDROCHLORIDE 3.5 MG/ML
1 SOLUTION/ DROPS TOPICAL EVERY 4 HOURS
Qty: 1 BOTTLE | Refills: 1 | Status: SHIPPED | OUTPATIENT
Start: 2018-08-27 | End: 2020-05-13

## 2018-08-27 NOTE — PROGRESS NOTES
Chief Complaint   Patient presents with   • Diabetes Mellitus   • Chronic Opiate Therapy       HPI:  Patient is a 53 y.o. male established patient who presents today for follow up on chronic DMII and chronic narcotic dependence. He has made positive changes in his lifestyle since his last visit including more exercise and improved diet. He continues to take daily Metformin ER and Starlix and now reports average home BG 150s-180s. He also suffers from chronic cervical disc disease/ IBS with associated chronic abdominal/rectal pain controlled with chronic Oxycontin, Robaxin, and Roxicodone. Pt reports that his pain is adequately controlled on Oxycontin and Roxicodone and activity is improved with medication use. He is now  exercising regularly and denies side effects related to narcotic use . He continues to take medication as prescribed and is not veering from agreed treatment regimen. Pt has failed non narcotic treatment options in the past and benefits from daily narcotic use. He does have a history of depression but not anxiety. Treatment goals discussed today and Opioid risk score is 1. His drug tox screen and CSA are pending updates today, and prior Utox consistent with reported medication use. I have reviewed his medical records and  today, and have determined that controlled substance treatment is medically indicated at this time. He also has chronic seasonal allergic rhinitis well controlled with Nasalide use and has a long standing history of recurrent bilateral conjunctivitis (from clogged tear ducts) treated with Ciloxin when flairs occur. He also has a skin lesion on right lower lateral leg that he would like treated at our visit today. He endorses 100% medication compliance and remains very busy traveling between [place] and [place] for work.     Patient Active Problem List    Diagnosis Date Noted   • Mixed dyslipidemia 10/02/2017   • Irritable bowel syndrome with both constipation and diarrhea 04/24/2017  "  • Chronic bilateral upper abdominal pain 04/24/2017   • Type 2 diabetes mellitus without complication, without long-term current use of insulin (HCC) 04/24/2017   • Essential hypertension, benign 05/06/2014   • Obesity (BMI 30-39.9) 05/06/2014   • Rectal pain 04/03/2014   • Former smoker 11/22/2013   • Dysthymic disorder 04/19/2013   • Cervical disc disease 04/19/2013   • Male hypogonadism 04/19/2013   • IBD (inflammatory bowel disease)        Past medical, surgical, family, and social history was reviewed and updated in Epic chart by me today.     Medications and allergies reviewed and updated in Epic chart by me today.     ROS:  Pertinent positives listed above in HPI. All other systems have been reviewed and are negative.    PE:  /64   Pulse 84   Temp 36.6 °C (97.9 °F)   Resp 14   Ht 1.829 m (6' 0.01\")   Wt 116.1 kg (256 lb)   SpO2 95%   BMI 34.71 kg/m²   Vital signs reviewed with patient.     Gen: Well developed; well nourished; no acute distress; age appropriate appearance   CV: Regular rate and rhythm; S1/ S2 present; no murmur, gallop or rub noted  Pulm: No respiratory distress; clear to ascultation b/l; no wheezing or stridor noted b/l  Abd: Adequate bowel sounds noted; soft and nontender; no rebound, rigidity, nor distention; obese  Extremities: No peripheral edema b/l LE extremities/ no clubbing nor cyanosis noted  Skin: Warm and dry; no rashes noted; dime size lesion on right lateral lower leg consistent with AK  Neuro: No focal deficits noted   Psych: AAOx4; mood and affect are appropriate    Procedure Note   AK skin lesion, as described above, destructed with Liquid Nitrogen including two 30-second thaw cycles  Pt. tolerated procedure well with no known complications, and wound care instructions provided to patient at visit today.     A/P:  1. Chronic type 2 diabetes mellitus without complication, without long-term current use of insulin (HCC)  Improved/ HgA1c 8.5 today (10.6 in Feb.) due " to improved diet and increase exercise since last visit. Pt is to continue daily Metformin and Starlix, daily BG monitoring, and continue with daily positive lifestyle choices.   - POCT A1C    2. Cervical disc disease/ Chronic narcotic dependence (HCC)/ Irritable bowel syndrome with both constipation and diarrhea  Stable/ controlled with chronic pain medication and Robaxin use/ CSA reviewed and signed by patient today/ urine sample collected for annual Utox/  reviewed without any issues noted. Pt is to continue current pain medication routine. Pt is well versed in safe use of narcotic medications and has no perceived side effects. He is also well versed in obtaining Narcan if unintentional overdose occurs. 3 x 1 month RX provided to patient at visit today for each medication.   - [facility] PAIN MANAGEMENT SCREEN; Future  - Controlled Substance Treatment Agreement  - oxyCODONE CR (OXYCONTIN) 10 MG Tablet Extended Release 12 hour Abuse-Deterrent; Take 1 Tab by mouth every 12 hours for 30 days.  Dispense: 60 Tab; Refill: 0  - oxyCODONE immediate release (ROXICODONE) 10 MG immediate release tablet; Take 1 Tab by mouth 2 times a day as needed for Moderate Pain for up to 30 days.  Dispense: 60 Tab; Refill: 0  - methocarbamol (ROBAXIN) 750 MG Tab; Take 1 Tab by mouth every 12 hours as needed.  Dispense: 60 Tab; Refill: 6    3. Chronic seasonal allergic rhinitis due to pollen  Stable/ pt is to continue daily Nasalide use. New RX sent to pharmacy for patient.   - flunisolide (NASALIDE) 25 MCG/ACT (0.025%) Solution; Spray 2 Sprays in nose 2 Times a Day.  Dispense: 3 Bottle; Refill: 4    4. Other conjunctivitis of both eyes  Long standing history of recurrent bilateral conjunctivitis due to clogged tear ducts. When acute infection occurs, symptoms well controlled with Ciloxin use. Due to his frequent travel between [place] and [place], will refill RX for patient so he has access to it as needed.   - ciprofloxacin (CILOXIN) 0.3  % Solution; Place 1 Drop in both eyes every 4 hours.  Dispense: 1 Bottle; Refill: 1    5. Vaccine counseling  Pt declines Shingrix vaccination with counseling at our visit today.     6. Actinic keratosis  Please refer to procedure note detailed above.    7. Obesity (BMI 30-39.9)  - Patient identified as having weight management issue.  Appropriate orders and counseling given.    Pt has improved significantly since his last visit and will contact our office if he has any health changes.

## 2018-08-28 RX ORDER — DAPAGLIFLOZIN 10 MG/1
1 TABLET, FILM COATED ORAL DAILY
Qty: 90 TAB | Refills: 3 | Status: SHIPPED | OUTPATIENT
Start: 2018-08-28 | End: 2019-08-14 | Stop reason: SDUPTHER

## 2018-08-29 RX ORDER — OFLOXACIN 3 MG/ML
1 SOLUTION/ DROPS OPHTHALMIC 4 TIMES DAILY
Qty: 1 BOTTLE | Refills: 2 | Status: SHIPPED | OUTPATIENT
Start: 2018-08-29 | End: 2019-11-18

## 2018-10-13 DIAGNOSIS — E11.9 TYPE 2 DIABETES MELLITUS WITHOUT COMPLICATION, WITHOUT LONG-TERM CURRENT USE OF INSULIN (HCC): ICD-10-CM

## 2018-10-15 RX ORDER — NATEGLINIDE 120 MG/1
TABLET ORAL
Qty: 360 TAB | Refills: 3 | Status: SHIPPED | OUTPATIENT
Start: 2018-10-15 | End: 2019-10-03 | Stop reason: SDUPTHER

## 2018-10-27 DIAGNOSIS — E78.5 DYSLIPIDEMIA: ICD-10-CM

## 2018-10-27 DIAGNOSIS — I10 ESSENTIAL HYPERTENSION, BENIGN: ICD-10-CM

## 2018-10-29 RX ORDER — SIMVASTATIN 40 MG
TABLET ORAL
Qty: 90 TAB | Refills: 1 | Status: SHIPPED | OUTPATIENT
Start: 2018-10-29 | End: 2019-05-01 | Stop reason: SDUPTHER

## 2018-10-29 RX ORDER — LISINOPRIL 40 MG/1
TABLET ORAL
Qty: 90 TAB | Refills: 1 | Status: SHIPPED | OUTPATIENT
Start: 2018-10-29 | End: 2019-05-01 | Stop reason: SDUPTHER

## 2019-01-28 ENCOUNTER — HOSPITAL ENCOUNTER (OUTPATIENT)
Facility: MEDICAL CENTER | Age: 54
End: 2019-01-28
Attending: FAMILY MEDICINE
Payer: COMMERCIAL

## 2019-01-28 ENCOUNTER — OFFICE VISIT (OUTPATIENT)
Dept: INTERNAL MEDICINE | Facility: IMAGING CENTER | Age: 54
End: 2019-01-28
Payer: COMMERCIAL

## 2019-01-28 VITALS
BODY MASS INDEX: 34.54 KG/M2 | HEART RATE: 91 BPM | RESPIRATION RATE: 14 BRPM | WEIGHT: 255 LBS | OXYGEN SATURATION: 92 % | DIASTOLIC BLOOD PRESSURE: 70 MMHG | SYSTOLIC BLOOD PRESSURE: 130 MMHG | HEIGHT: 72 IN | TEMPERATURE: 96.8 F

## 2019-01-28 DIAGNOSIS — Z91.199 NON-COMPLIANCE: ICD-10-CM

## 2019-01-28 DIAGNOSIS — E29.1 MALE HYPOGONADISM: Chronic | ICD-10-CM

## 2019-01-28 DIAGNOSIS — E78.2 MIXED DYSLIPIDEMIA: Chronic | ICD-10-CM

## 2019-01-28 DIAGNOSIS — E11.9 TYPE 2 DIABETES MELLITUS WITHOUT COMPLICATION, WITHOUT LONG-TERM CURRENT USE OF INSULIN (HCC): Chronic | ICD-10-CM

## 2019-01-28 DIAGNOSIS — F11.20 CHRONIC NARCOTIC DEPENDENCE (HCC): ICD-10-CM

## 2019-01-28 DIAGNOSIS — Z00.00 HEALTH CARE MAINTENANCE: ICD-10-CM

## 2019-01-28 DIAGNOSIS — M50.90 CERVICAL DISC DISEASE: Chronic | ICD-10-CM

## 2019-01-28 DIAGNOSIS — I10 ESSENTIAL HYPERTENSION, BENIGN: Chronic | ICD-10-CM

## 2019-01-28 DIAGNOSIS — Z12.5 SCREENING FOR PROSTATE CANCER: ICD-10-CM

## 2019-01-28 DIAGNOSIS — Z00.00 WELLNESS EXAMINATION: ICD-10-CM

## 2019-01-28 DIAGNOSIS — N40.1 BENIGN PROSTATIC HYPERPLASIA WITH INCOMPLETE BLADDER EMPTYING: ICD-10-CM

## 2019-01-28 DIAGNOSIS — F34.1 DYSTHYMIC DISORDER: Chronic | ICD-10-CM

## 2019-01-28 DIAGNOSIS — R39.14 BENIGN PROSTATIC HYPERPLASIA WITH INCOMPLETE BLADDER EMPTYING: ICD-10-CM

## 2019-01-28 LAB
25(OH)D3 SERPL-MCNC: 64 NG/ML (ref 30–100)
ALBUMIN SERPL BCP-MCNC: 4.5 G/DL (ref 3.2–4.9)
ALBUMIN/GLOB SERPL: 1.5 G/DL
ALP SERPL-CCNC: 66 U/L (ref 30–99)
ALT SERPL-CCNC: 20 U/L (ref 2–50)
ANION GAP SERPL CALC-SCNC: 12 MMOL/L (ref 0–11.9)
AST SERPL-CCNC: 16 U/L (ref 12–45)
BASOPHILS # BLD AUTO: 0.6 % (ref 0–1.8)
BASOPHILS # BLD: 0.05 K/UL (ref 0–0.12)
BILIRUB SERPL-MCNC: 0.4 MG/DL (ref 0.1–1.5)
BUN SERPL-MCNC: 18 MG/DL (ref 8–22)
CALCIUM SERPL-MCNC: 9.5 MG/DL (ref 8.5–10.5)
CHLORIDE SERPL-SCNC: 98 MMOL/L (ref 96–112)
CHOLEST SERPL-MCNC: 175 MG/DL (ref 100–199)
CO2 SERPL-SCNC: 25 MMOL/L (ref 20–33)
CREAT SERPL-MCNC: 0.84 MG/DL (ref 0.5–1.4)
CREAT UR-MCNC: 50.9 MG/DL
EOSINOPHIL # BLD AUTO: 0.13 K/UL (ref 0–0.51)
EOSINOPHIL NFR BLD: 1.6 % (ref 0–6.9)
ERYTHROCYTE [DISTWIDTH] IN BLOOD BY AUTOMATED COUNT: 42.7 FL (ref 35.9–50)
EST. AVERAGE GLUCOSE BLD GHB EST-MCNC: 278 MG/DL
GLOBULIN SER CALC-MCNC: 3 G/DL (ref 1.9–3.5)
GLUCOSE SERPL-MCNC: 273 MG/DL (ref 65–99)
HBA1C MFR BLD: 11.3 % (ref 0–5.6)
HCT VFR BLD AUTO: 41.1 % (ref 42–52)
HDLC SERPL-MCNC: 51 MG/DL
HGB BLD-MCNC: 13.3 G/DL (ref 14–18)
IMM GRANULOCYTES # BLD AUTO: 0.04 K/UL (ref 0–0.11)
IMM GRANULOCYTES NFR BLD AUTO: 0.5 % (ref 0–0.9)
LDLC SERPL CALC-MCNC: 106 MG/DL
LYMPHOCYTES # BLD AUTO: 2.42 K/UL (ref 1–4.8)
LYMPHOCYTES NFR BLD: 30.1 % (ref 22–41)
MCH RBC QN AUTO: 28.9 PG (ref 27–33)
MCHC RBC AUTO-ENTMCNC: 32.4 G/DL (ref 33.7–35.3)
MCV RBC AUTO: 89.2 FL (ref 81.4–97.8)
MICROALBUMIN UR-MCNC: <0.7 MG/DL
MICROALBUMIN/CREAT UR: NORMAL MG/G (ref 0–30)
MONOCYTES # BLD AUTO: 0.73 K/UL (ref 0–0.85)
MONOCYTES NFR BLD AUTO: 9.1 % (ref 0–13.4)
NEUTROPHILS # BLD AUTO: 4.67 K/UL (ref 1.82–7.42)
NEUTROPHILS NFR BLD: 58.1 % (ref 44–72)
NRBC # BLD AUTO: 0 K/UL
NRBC BLD-RTO: 0 /100 WBC
PLATELET # BLD AUTO: 233 K/UL (ref 164–446)
PMV BLD AUTO: 11.5 FL (ref 9–12.9)
POTASSIUM SERPL-SCNC: 4 MMOL/L (ref 3.6–5.5)
PROT SERPL-MCNC: 7.5 G/DL (ref 6–8.2)
PSA SERPL-MCNC: 0.29 NG/ML (ref 0–4)
RBC # BLD AUTO: 4.61 M/UL (ref 4.7–6.1)
SODIUM SERPL-SCNC: 135 MMOL/L (ref 135–145)
TRIGL SERPL-MCNC: 89 MG/DL (ref 0–149)
TSH SERPL DL<=0.005 MIU/L-ACNC: 1.49 UIU/ML (ref 0.38–5.33)
WBC # BLD AUTO: 8 K/UL (ref 4.8–10.8)

## 2019-01-28 PROCEDURE — 99214 OFFICE O/P EST MOD 30 MIN: CPT | Performed by: FAMILY MEDICINE

## 2019-01-28 PROCEDURE — 84270 ASSAY OF SEX HORMONE GLOBUL: CPT

## 2019-01-28 PROCEDURE — 83036 HEMOGLOBIN GLYCOSYLATED A1C: CPT

## 2019-01-28 PROCEDURE — 82306 VITAMIN D 25 HYDROXY: CPT

## 2019-01-28 PROCEDURE — 84403 ASSAY OF TOTAL TESTOSTERONE: CPT

## 2019-01-28 PROCEDURE — 82570 ASSAY OF URINE CREATININE: CPT

## 2019-01-28 PROCEDURE — 82043 UR ALBUMIN QUANTITATIVE: CPT

## 2019-01-28 PROCEDURE — 80053 COMPREHEN METABOLIC PANEL: CPT

## 2019-01-28 PROCEDURE — 85025 COMPLETE CBC W/AUTO DIFF WBC: CPT

## 2019-01-28 PROCEDURE — 84153 ASSAY OF PSA TOTAL: CPT

## 2019-01-28 PROCEDURE — 80061 LIPID PANEL: CPT

## 2019-01-28 PROCEDURE — 84443 ASSAY THYROID STIM HORMONE: CPT

## 2019-01-28 RX ORDER — OXYCODONE HCL 10 MG/1
10 TABLET, FILM COATED, EXTENDED RELEASE ORAL EVERY 12 HOURS PRN
Qty: 60 TAB | Refills: 0 | Status: SHIPPED | OUTPATIENT
Start: 2019-03-31 | End: 2019-04-30

## 2019-01-28 RX ORDER — OXYCODONE HYDROCHLORIDE 10 MG/1
10 TABLET ORAL 2 TIMES DAILY PRN
Qty: 60 TAB | Refills: 0 | Status: SHIPPED | OUTPATIENT
Start: 2019-03-31 | End: 2019-01-28 | Stop reason: SDUPTHER

## 2019-01-28 RX ORDER — OXYCODONE HYDROCHLORIDE 10 MG/1
10 TABLET ORAL 2 TIMES DAILY PRN
Qty: 60 TAB | Refills: 0 | Status: SHIPPED | OUTPATIENT
Start: 2019-01-28 | End: 2019-01-28 | Stop reason: SDUPTHER

## 2019-01-28 RX ORDER — OXYCODONE HYDROCHLORIDE 10 MG/1
10 TABLET ORAL 2 TIMES DAILY PRN
Qty: 60 TAB | Refills: 0 | Status: SHIPPED | OUTPATIENT
Start: 2019-01-28 | End: 2019-02-27

## 2019-01-28 RX ORDER — OXYCODONE HCL 10 MG/1
10 TABLET, FILM COATED, EXTENDED RELEASE ORAL EVERY 12 HOURS PRN
Qty: 60 TAB | Refills: 0 | Status: SHIPPED | OUTPATIENT
Start: 2019-01-28 | End: 2019-01-28 | Stop reason: SDUPTHER

## 2019-01-28 RX ORDER — OXYCODONE HYDROCHLORIDE 10 MG/1
10 TABLET ORAL 2 TIMES DAILY PRN
Qty: 60 TAB | Refills: 0 | Status: SHIPPED | OUTPATIENT
Start: 2019-02-28 | End: 2019-01-28 | Stop reason: SDUPTHER

## 2019-01-28 RX ORDER — OXYCODONE HCL 10 MG/1
10 TABLET, FILM COATED, EXTENDED RELEASE ORAL EVERY 12 HOURS PRN
Qty: 60 TAB | Refills: 0 | Status: SHIPPED | OUTPATIENT
Start: 2019-02-28 | End: 2019-01-28 | Stop reason: SDUPTHER

## 2019-01-28 RX ORDER — TAMSULOSIN HYDROCHLORIDE 0.4 MG/1
0.4 CAPSULE ORAL
Qty: 90 CAP | Refills: 3 | Status: SHIPPED | OUTPATIENT
Start: 2019-01-28 | End: 2019-11-18

## 2019-01-28 RX ORDER — BUPROPION HYDROCHLORIDE 300 MG/1
300 TABLET ORAL EVERY MORNING
Qty: 90 TAB | Refills: 3 | Status: SHIPPED | OUTPATIENT
Start: 2019-01-28 | End: 2020-01-06 | Stop reason: SDUPTHER

## 2019-01-28 NOTE — PROGRESS NOTES
Chief Complaint   Patient presents with   • Diabetes Mellitus   • Chronic Opiate Therapy       HPI:  Patient is a 53 y.o. male established patient who presents today for  presents today for follow up on chronic DMII and chronic narcotic dependence. He freely admits to be non-compliant with all aspects of his diabetic control since October 2018 and does not follow an ADA diet, does not check his blood sugar regularly, and does not exercise. He continues to take daily metformin ER and starlix and is not interested in annual retinal exam. He also suffers from chronic cervical disc disease pain controlled with chronic Oxycontin, Robaxin, and Roxicodone PRN. Pt reports that his pain is adequately controlled on Oxycontin and Roxicodone, and activity is improved with medication use. He does not have a history of falls and denies side effects related to narcotic use . He continues to take medication as prescribed and is not veering from agreed treatment regimen. Pt did a self wean of his chronic narcotics since our last visit but determined that he could not go without long term narcotic use. Pt has failed non narcotic treatment options in the past and benefits from daily narcotic use. Treatment goals discussed today and Opioid risk score is 1. His drug tox screen and CSA are UTD, and consistent with reported medication use. Consent for Opiate use is due for annual review at visit today. I have reviewed his medical records and  today, and have determined that controlled substance treatment is medically indicated at this time. He is also complaining of ongoing dysthymia, despite taking prozac 80 mg daily. Although he is not suicidal, he reports having ongoing fascination with death since the age of [age]. 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 He is also very aware that he does not do anything to improve his lifestyle or outlook on life in general. He also is complaining  of symptoms (decreased urine stream) consistent with BPH today - has used flomax in past with good results and would like to restart drug. He stopped using flomax daily on his own for no apparent reason and is hoping medication restart will improve his condition. He is fasting today for lab draw and endorses 100% medication compliance.     Patient Active Problem List    Diagnosis Date Noted   • Mixed dyslipidemia 10/02/2017   • Irritable bowel syndrome with both constipation and diarrhea 04/24/2017   • Chronic bilateral upper abdominal pain 04/24/2017   • Type 2 diabetes mellitus without complication, without long-term current use of insulin (HCC) 04/24/2017   • Essential hypertension, benign 05/06/2014   • Obesity (BMI 30-39.9) 05/06/2014   • Rectal pain 04/03/2014   • Former smoker 11/22/2013   • Dysthymic disorder 04/19/2013   • Cervical disc disease 04/19/2013   • Male hypogonadism 04/19/2013   • IBD (inflammatory bowel disease)      Past medical, surgical, family, and social history was reviewed in [employer] chart by me today.     Medications and allergies reviewed and updated in Epic chart by me today.     ROS:  Pertinent positives listed above in HPI. All other systems have been reviewed and are negative.    PE:   /70 (BP Location: Left arm, Patient Position: Sitting)   Pulse 91   Temp 36 °C (96.8 °F) (Temporal)   Resp 14   Ht 1.829 m (6')   Wt 115.7 kg (255 lb)   SpO2 92%   BMI 34.58 kg/m²   Vital signs reviewed with patient.     Gen: Well developed; well nourished; no acute distress; age appropriate appearance   HEENT: Normocephalic; atraumatic; PEERLA b/l; sclera clear b/l; b/l external auditory canals WNL; b/l TM WNL; nares patent; oropharynx clear; oral mucosa moist; tongue midline; dentition adequate   Neck: No adenopathy; no thyromegaly  CV: Regular rate and rhythm; S1/ S2 present; no murmur, gallop or rub noted  Pulm: No respiratory distress; clear to ascultation b/l; no wheezing or stridor  noted b/l  Abd: Adequate bowel sounds noted; soft and nontender; no rebound, rigidity, nor distention; obese  Extremities: No peripheral edema b/l LE extremities/ no clubbing nor cyanosis noted  Skin: Warm and dry; no rashes noted   Neuro: No focal deficits noted   Psych: AAOx4; mood and affect are baseline (flat)  NICOLE deferred due to PSA testing today    A/P:  1. Type 2 diabetes mellitus without complication, without long-term current use of insulin (HCC)  Uncontrolled and coupled with patient's admission that he has not been paying attention to ADA diet nor exercising regularly, I feel that his HgAc1 level will be negatively impacted. Will check HgA1c and urine microalbumin level today, and patient encouraged to improve his habits moving forward. He is to continue daily Metformin ER and Starlix use (not interested in different medication) and attempt to decrease daily overt sugar intake. He declines annual retina exams and monofilament exam.   - HEMOGLOBIN A1C; Future  - MICROALBUMIN CREAT RATIO URINE; Future    2. Mixed dyslipidemia  Stable/ pt is to continue daily zocor use and is due for fasting lipid panel today.   - Lipid Profile; Future    3. Male hypogonadism  Pt has been on testosterone replacement in past and will check T levels today (may be a strong contributor to his dysthymia).   - TESTOSTERONE F&T MALE ADULT; Future    4. Essential hypertension, benign  Stable/ pt is to continue daily medication use and is due for fasting labs today.   - CBC WITH DIFFERENTIAL; Future  - COMP METABOLIC PANEL; Future    5. Dysthymic disorder  Ongoing issue for patient since the age of 8. He is currently taking prozac 80 mg daily without effect and is open to trying a different medication. He also suffers from intermittent anxiety, and I feel that he may benefit from Wellbutrin XL use. Due to his his prozac dose, I do not feel that a gradual Wellbutrin dose increase is required at this time. New RX sent to local  pharmacy today. Pt is not suicidal and is well aware of resources available for help - declines referral to mental health professional.   - buPROPion (WELLBUTRIN XL) 300 MG XL tablet; Take 1 Tab by mouth every morning.  Dispense: 90 Tab; Refill: 3    6. Cervical disc disease/  Chronic narcotic dependence (HCC)  Stable/ condition controlled with chronic pain medication and Robaxin use (pt failed self wean since our last visit in August). Consent for opiate prescription reviewed and signed by patient today/ CSA and utox are UTD and consistent with use/  reviewed without any issues noted. Pt is to continue current pain medication routine. Pt is well versed in safe use of narcotic medications and has no perceived side effects. He is also well versed in obtaining Narcan if unintentional overdose occurs. 3 x 1 month RX provided to patient at visit today for each medication.   - Consent for Opiate Prescription  - oxyCODONE CR (OXYCONTIN) 10 MG Tablet Extended Release 12 hour Abuse-Deterrent; Take 1 Tab by mouth every 12 hours as needed (pain) for up to 30 days.  Dispense: 60 Tab; Refill: 0  - oxyCODONE immediate release (ROXICODONE) 10 MG immediate release tablet; Take 1 Tab by mouth 2 times a day as needed for Moderate Pain for up to 30 days.  Dispense: 60 Tab; Refill: 0    7. Screening for prostate cancer  Pt will need NICOLE at next visit. Due for PSA today.   - PROSTATE SPECIFIC AG SCREENING; Future    8. Health care maintenance  Due for labs today.   - TSH WITH REFLEX TO FT4; Future  - VITAMIN D,25 HYDROXY; Future    9. Benign prostatic hyperplasia with incomplete bladder emptying  Pt has used flomax in past with good results. Will have patient restart using flomax daily, and he will need NICOLE at next visit (deferred due to PSA testing today). New RX sent to local pharmacy.   - tamsulosin (FLOMAX) 0.4 MG capsule; Take 1 Cap by mouth ONE-HALF HOUR AFTER BREAKFAST.  Dispense: 90 Cap; Refill: 3    10. Wellness  examination  Pt is UTD with HCM items that he deems to be important at this time.     11. Non-compliance  Ongoing issue for patient despite significant counseling from me on benefits of lifestyle and diet compliance. Pt remains at high risk for decompensation, and he is aware of the risk.    I will be in touch with patient regarding lab results when available for further care planning.

## 2019-01-29 LAB
SHBG SERPL-SCNC: 23 NMOL/L (ref 11–80)
TESTOST FREE MFR SERPL: 2.1 % (ref 1.6–2.9)
TESTOST FREE SERPL-MCNC: 43 PG/ML (ref 47–244)
TESTOST SERPL-MCNC: 206 NG/DL (ref 300–890)

## 2019-02-15 DIAGNOSIS — K58.2 IRRITABLE BOWEL SYNDROME WITH BOTH CONSTIPATION AND DIARRHEA: ICD-10-CM

## 2019-02-15 RX ORDER — MESALAMINE 1.2 G/1
TABLET, DELAYED RELEASE ORAL
Qty: 360 TAB | Refills: 1 | Status: SHIPPED | OUTPATIENT
Start: 2019-02-15 | End: 2019-11-24 | Stop reason: SDUPTHER

## 2019-05-01 DIAGNOSIS — E78.5 DYSLIPIDEMIA: ICD-10-CM

## 2019-05-01 DIAGNOSIS — I10 ESSENTIAL HYPERTENSION, BENIGN: ICD-10-CM

## 2019-05-01 DIAGNOSIS — F34.1 DYSTHYMIC DISORDER: Chronic | ICD-10-CM

## 2019-05-02 RX ORDER — LISINOPRIL 40 MG/1
TABLET ORAL
Qty: 90 TAB | Refills: 3 | Status: SHIPPED | OUTPATIENT
Start: 2019-05-02 | End: 2020-07-02 | Stop reason: SDUPTHER

## 2019-05-02 RX ORDER — FLUOXETINE HYDROCHLORIDE 20 MG/1
CAPSULE ORAL
Qty: 360 CAP | Refills: 3 | Status: SHIPPED | OUTPATIENT
Start: 2019-05-02 | End: 2022-02-15

## 2019-05-02 RX ORDER — SIMVASTATIN 40 MG
TABLET ORAL
Qty: 90 TAB | Refills: 3 | Status: SHIPPED | OUTPATIENT
Start: 2019-05-02 | End: 2020-03-30 | Stop reason: SDUPTHER

## 2019-05-03 DIAGNOSIS — E11.9 TYPE 2 DIABETES MELLITUS WITHOUT COMPLICATION, WITHOUT LONG-TERM CURRENT USE OF INSULIN (HCC): ICD-10-CM

## 2019-05-03 RX ORDER — METFORMIN HYDROCHLORIDE 500 MG/1
TABLET, EXTENDED RELEASE ORAL
Qty: 360 TAB | Refills: 2 | Status: SHIPPED | OUTPATIENT
Start: 2019-05-03 | End: 2020-01-20 | Stop reason: SDUPTHER

## 2019-06-17 ENCOUNTER — OFFICE VISIT (OUTPATIENT)
Dept: INTERNAL MEDICINE | Facility: IMAGING CENTER | Age: 54
End: 2019-06-17
Payer: COMMERCIAL

## 2019-06-17 VITALS
BODY MASS INDEX: 33.75 KG/M2 | SYSTOLIC BLOOD PRESSURE: 130 MMHG | WEIGHT: 249.2 LBS | DIASTOLIC BLOOD PRESSURE: 71 MMHG | HEART RATE: 89 BPM | TEMPERATURE: 97.8 F | OXYGEN SATURATION: 94 % | RESPIRATION RATE: 17 BRPM | HEIGHT: 72 IN

## 2019-06-17 DIAGNOSIS — G89.29 CHRONIC BILATERAL UPPER ABDOMINAL PAIN: Chronic | ICD-10-CM

## 2019-06-17 DIAGNOSIS — F34.1 DYSTHYMIC DISORDER: Chronic | ICD-10-CM

## 2019-06-17 DIAGNOSIS — R10.11 CHRONIC BILATERAL UPPER ABDOMINAL PAIN: Chronic | ICD-10-CM

## 2019-06-17 DIAGNOSIS — E66.9 OBESITY (BMI 30-39.9): Chronic | ICD-10-CM

## 2019-06-17 DIAGNOSIS — N40.1 BENIGN PROSTATIC HYPERPLASIA WITH INCOMPLETE BLADDER EMPTYING: ICD-10-CM

## 2019-06-17 DIAGNOSIS — R39.14 BENIGN PROSTATIC HYPERPLASIA WITH INCOMPLETE BLADDER EMPTYING: ICD-10-CM

## 2019-06-17 DIAGNOSIS — F11.90 CHRONIC NARCOTIC USE: ICD-10-CM

## 2019-06-17 DIAGNOSIS — K64.8 OTHER HEMORRHOIDS: Chronic | ICD-10-CM

## 2019-06-17 DIAGNOSIS — M50.90 CERVICAL DISC DISEASE: Chronic | ICD-10-CM

## 2019-06-17 DIAGNOSIS — R21 RASH: ICD-10-CM

## 2019-06-17 DIAGNOSIS — Z91.199 NON-COMPLIANCE: Chronic | ICD-10-CM

## 2019-06-17 DIAGNOSIS — R10.12 CHRONIC BILATERAL UPPER ABDOMINAL PAIN: Chronic | ICD-10-CM

## 2019-06-17 DIAGNOSIS — E11.9 TYPE 2 DIABETES MELLITUS WITHOUT COMPLICATION, WITHOUT LONG-TERM CURRENT USE OF INSULIN (HCC): Chronic | ICD-10-CM

## 2019-06-17 LAB
HBA1C MFR BLD: 12.2 % (ref 0–5.6)
INT CON NEG: ABNORMAL
INT CON POS: ABNORMAL

## 2019-06-17 PROCEDURE — 99214 OFFICE O/P EST MOD 30 MIN: CPT | Performed by: FAMILY MEDICINE

## 2019-06-17 PROCEDURE — 83036 HEMOGLOBIN GLYCOSYLATED A1C: CPT | Performed by: FAMILY MEDICINE

## 2019-06-17 RX ORDER — TRIAMCINOLONE ACETONIDE 1 MG/G
CREAM TOPICAL
Qty: 1 TUBE | Refills: 1 | Status: SHIPPED | OUTPATIENT
Start: 2019-06-17 | End: 2019-08-14 | Stop reason: SDUPTHER

## 2019-06-17 RX ORDER — OXYCODONE HYDROCHLORIDE 10 MG/1
10 TABLET ORAL 2 TIMES DAILY PRN
Qty: 60 TAB | Refills: 0 | Status: SHIPPED | OUTPATIENT
Start: 2019-08-16 | End: 2019-09-15

## 2019-06-17 RX ORDER — FINASTERIDE 5 MG/1
5 TABLET, FILM COATED ORAL DAILY
Qty: 90 TAB | Refills: 3 | Status: SHIPPED | OUTPATIENT
Start: 2019-06-17 | End: 2020-03-30 | Stop reason: SDUPTHER

## 2019-06-17 RX ORDER — OXYCODONE HCL 10 MG/1
10 TABLET, FILM COATED, EXTENDED RELEASE ORAL 2 TIMES DAILY
Qty: 60 TAB | Refills: 0 | Status: SHIPPED | OUTPATIENT
Start: 2019-07-17 | End: 2019-06-17 | Stop reason: SDUPTHER

## 2019-06-17 RX ORDER — OXYCODONE HYDROCHLORIDE 10 MG/1
10 TABLET ORAL 2 TIMES DAILY PRN
Qty: 60 TAB | Refills: 0 | Status: SHIPPED | OUTPATIENT
Start: 2019-07-17 | End: 2019-06-17 | Stop reason: SDUPTHER

## 2019-06-17 RX ORDER — OXYCODONE HCL 10 MG/1
10 TABLET, FILM COATED, EXTENDED RELEASE ORAL 2 TIMES DAILY
Qty: 60 TAB | Refills: 0 | Status: SHIPPED | OUTPATIENT
Start: 2019-08-16 | End: 2019-09-15

## 2019-06-17 RX ORDER — OXYCODONE HCL 10 MG/1
10 TABLET, FILM COATED, EXTENDED RELEASE ORAL 2 TIMES DAILY
Qty: 60 TAB | Refills: 0 | Status: SHIPPED | OUTPATIENT
Start: 2019-06-17 | End: 2019-06-17 | Stop reason: SDUPTHER

## 2019-06-17 RX ORDER — OXYCODONE HYDROCHLORIDE 10 MG/1
10 TABLET ORAL 2 TIMES DAILY PRN
Qty: 60 TAB | Refills: 0 | Status: SHIPPED | OUTPATIENT
Start: 2019-06-17 | End: 2019-06-17 | Stop reason: SDUPTHER

## 2019-06-17 NOTE — PROGRESS NOTES
"Chief Complaint   Patient presents with   • Diabetes Mellitus   • Rash     Under right pectoral area x 3 months   • Chronic Opiate Therapy   • Benign Prostatic Hypertrophy       HPI:  Patient is a 54 y.o. male established patient who presents today for follow up on multiple health issues. He continues to be non compliant with all aspects of care related to chronic obesity and chronic non insulin dependent DMII. He reports eating massive quantities of food daily and consuming on average 4-8 chocolate pudding snacks, 4 yogurts, 2 melons every day additionally. He is very aware of the potential health risks involved with uncontrolled DMII and obesity and continues to \"just not care.\" He declines annual retinal exam but is willing to have POC A1 testing done today. He also has chronic BPH and reports that Flomax \"has stopped working\" for him and would like to try something different. He also suffers from chronic diffuse pain controlled with chronic Oxycontin, Robaxin, and Roxicodone PRN. Pt reports that his pain is adequately controlled on Oxycontin and Roxicodone, and activity is improved with medication use. He does not have a history of falls and denies side effects related to narcotic use . He continues to take medication as prescribed and is not veering from agreed treatment regimen.   Pt has tried weaning from narcotics in past without sustainable success. Pt has failed non narcotic treatment options in the past and benefits from daily narcotic use. Treatment goals discussed today and Opioid risk score is 1. His drug tox screen and consents are UTD and consistent with reported use. I have reviewed his medical records and  today, and have determined that controlled substance treatment is medically indicated at this time. He is also complaining of ongoing dysthymia, despite daily medication use. He continues to decline referral to mental health professional and denies safety concerns at this time. He also has a " small patch of rash, below his right pec area, that has been present for three months without known etiology. He has been applying rubbing alcohol to the area without resolution. He also has chronic hemorrhoids that bleed intermittently, depending on the softness of his stool. 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       Patient Active Problem List    Diagnosis Date Noted   • Non-compliance 06/17/2019   • Other hemorrhoids 06/17/2019   • Benign prostatic hyperplasia with incomplete bladder emptying 01/28/2019   • Mixed dyslipidemia 10/02/2017   • Irritable bowel syndrome with both constipation and diarrhea 04/24/2017   • Chronic bilateral upper abdominal pain 04/24/2017   • Type 2 diabetes mellitus without complication, without long-term current use of insulin (HCC) 04/24/2017   • Essential hypertension, benign 05/06/2014   • Obesity (BMI 30-39.9) 05/06/2014   • Rectal pain 04/03/2014   • Former smoker 11/22/2013   • Dysthymic disorder 04/19/2013   • Cervical disc disease 04/19/2013   • Male hypogonadism 04/19/2013   • IBD (inflammatory bowel disease)        Past medical, surgical, family, and social history was reviewed and updated in Epic chart by me today.     Medications and allergies reviewed and updated in Epic chart by me today.     ROS:  Pertinent positives listed above in HPI. All other systems have been reviewed and are negative.    PE:   /71 (BP Location: Left arm, Patient Position: Sitting, BP Cuff Size: Adult)   Pulse 89   Temp 36.6 °C (97.8 °F) (Temporal)   Resp 17   Ht 1.829 m (6')   Wt 113 kg (249 lb 3.2 oz)   SpO2 94%   BMI 33.80 kg/m²   Vital signs reviewed with patient.     Gen: Well developed; well nourished; no acute distress; non toxic appearance   CV: Regular rate and rhythm; S1/ S2 present; no murmur, gallop or rub noted  Pulm: No respiratory distress; clear to ascultation b/l; no  wheezing or stridor noted b/l  Abd: Adequate bowel sounds noted; soft and nontender; no rebound, rigidity, nor distention; obese  Extremities: No peripheral edema b/l LE extremities/ no clubbing nor cyanosis noted  Skin: Warm and dry; no rashes noted; small patch of raised dry red skin under right pec  Neuro: No focal deficits noted   Psych: AAOx4; mood and affect are appropriate - more interactive with me at visit today    A/P:  1. Type 2 diabetes mellitus without complication, without long-term current use of insulin (HCC)  Uncontrolled/ HgA1c is now 12.2% (11.3% January 2019). Pt continues to take oral medications and does not want any medication change done. He declines annual retina exams and monofilament exam. He has numerous family members that have lost vital organs and limbs due to diabetes and does not want to be at all compliant with aggressive treatment plans moving forward. I offered patient different medications and lifestyle/diet change suggestions but he declines at this time.   - POCT  A1C    2. Non-compliance  Ongoing issue for patient despite extensive, ongoing counseling. He is well aware of increased risk for morbidity and mortality related to non compliance.     3. Chronic bilateral upper abdominal pain/ Cervical disc disease/ Chronic narcotic use  Stable/ condition controlled with chronic pain medication and Robaxin use (pt failed self wean since our last visit in August). Consent for opiate prescription reviewed and signed by patient today/ CSA and utox are UTD and consistent with use/  reviewed without any issues noted. Pt is to continue current pain medication routine. Pt is well versed in safe use of narcotic medications and has no perceived side effects. He is also well versed in obtaining Narcan if unintentional overdose occurs. 3 x 1 month RX provided to patient at visit today for each medication.   - oxyCODONE CR (OXYCONTIN) 10 MG Tablet Extended Release 12 hour Abuse-Deterrent;  Take 1 Tab by mouth 2 Times a Day for 30 days.  Dispense: 60 Tab; Refill: 0  - oxyCODONE immediate release (ROXICODONE) 10 MG immediate release tablet; Take 1 Tab by mouth 2 times a day as needed for Moderate Pain for up to 30 days.  Dispense: 60 Tab; Refill: 0    4. Benign prostatic hyperplasia with incomplete bladder emptying  Although patient has used Flomax in the past with good results, he reports that it is no longer working for him. Will change to Proscar and follow clinical response. New RX sent to local pharmacy.   - finasteride (PROSCAR) 5 MG Tab; Take 1 Tab by mouth every day.  Dispense: 90 Tab; Refill: 3    5. Rash  Mild, localized rash present under right pec of unclear etiology. Recommend patient discontinue use of rubbing alcohol on site and will trial kenalog cream. New RX sent to pharmacy.   - triamcinolone acetonide (KENALOG) 0.1 % Cream; Apply small amount of cream to skin rash area twice per day for seven days.  Dispense: 1 Tube; Refill: 1    6. Dysthymic disorder  Ongoing issue for patient since the age of 8. Pt declines referral to mental health counselor and will continue daily medication use. He declines safety concerns and has a very involved family.     7. Other hemorrhoids  Ongoing issue for patient for the past ten years. I recommended referral to GI for banding evaluation, and he reports that he is too busy at this time.     8. Obesity (BMI 30-39.9)  Remains a concern for patient but he is not interested in improving this condition at this time.     Pt is to contact our office if condition changes.

## 2019-08-14 DIAGNOSIS — R21 RASH: ICD-10-CM

## 2019-08-14 RX ORDER — TRIAMCINOLONE ACETONIDE 1 MG/G
CREAM TOPICAL
Qty: 15 G | Refills: 0 | Status: SHIPPED | OUTPATIENT
Start: 2019-08-14 | End: 2019-11-18

## 2019-08-14 RX ORDER — DAPAGLIFLOZIN 10 MG/1
TABLET, FILM COATED ORAL
Qty: 90 TAB | Refills: 3 | Status: SHIPPED | OUTPATIENT
Start: 2019-08-14 | End: 2020-07-01 | Stop reason: SDUPTHER

## 2019-10-03 DIAGNOSIS — E11.9 TYPE 2 DIABETES MELLITUS WITHOUT COMPLICATION, WITHOUT LONG-TERM CURRENT USE OF INSULIN (HCC): ICD-10-CM

## 2019-10-03 RX ORDER — NATEGLINIDE 120 MG/1
TABLET ORAL
Qty: 360 TAB | Refills: 3 | Status: SHIPPED | OUTPATIENT
Start: 2019-10-03 | End: 2020-09-24 | Stop reason: SDUPTHER

## 2019-11-18 ENCOUNTER — HOSPITAL ENCOUNTER (OUTPATIENT)
Dept: RADIOLOGY | Facility: MEDICAL CENTER | Age: 54
End: 2019-11-18
Attending: FAMILY MEDICINE
Payer: COMMERCIAL

## 2019-11-18 ENCOUNTER — OFFICE VISIT (OUTPATIENT)
Dept: INTERNAL MEDICINE | Facility: IMAGING CENTER | Age: 54
End: 2019-11-18
Payer: COMMERCIAL

## 2019-11-18 VITALS
WEIGHT: 249.12 LBS | HEART RATE: 70 BPM | TEMPERATURE: 98.2 F | RESPIRATION RATE: 16 BRPM | BODY MASS INDEX: 33.74 KG/M2 | OXYGEN SATURATION: 97 % | SYSTOLIC BLOOD PRESSURE: 129 MMHG | HEIGHT: 72 IN | DIASTOLIC BLOOD PRESSURE: 68 MMHG

## 2019-11-18 DIAGNOSIS — G89.29 CHRONIC BILATERAL UPPER ABDOMINAL PAIN: Chronic | ICD-10-CM

## 2019-11-18 DIAGNOSIS — R21 SKIN RASH: ICD-10-CM

## 2019-11-18 DIAGNOSIS — M25.531 RIGHT WRIST PAIN: ICD-10-CM

## 2019-11-18 DIAGNOSIS — E11.9 TYPE 2 DIABETES MELLITUS WITHOUT COMPLICATION, WITHOUT LONG-TERM CURRENT USE OF INSULIN (HCC): ICD-10-CM

## 2019-11-18 DIAGNOSIS — K64.8 OTHER HEMORRHOIDS: ICD-10-CM

## 2019-11-18 DIAGNOSIS — R10.12 CHRONIC BILATERAL UPPER ABDOMINAL PAIN: Chronic | ICD-10-CM

## 2019-11-18 DIAGNOSIS — M50.90 CERVICAL DISC DISEASE: Chronic | ICD-10-CM

## 2019-11-18 DIAGNOSIS — R10.11 CHRONIC BILATERAL UPPER ABDOMINAL PAIN: Chronic | ICD-10-CM

## 2019-11-18 DIAGNOSIS — E66.9 OBESITY (BMI 30-39.9): Chronic | ICD-10-CM

## 2019-11-18 DIAGNOSIS — F11.90 CHRONIC NARCOTIC USE: ICD-10-CM

## 2019-11-18 LAB
HBA1C MFR BLD: 9.8 % (ref 0–5.6)
INT CON NEG: ABNORMAL
INT CON POS: ABNORMAL

## 2019-11-18 PROCEDURE — 73110 X-RAY EXAM OF WRIST: CPT | Mod: RT

## 2019-11-18 PROCEDURE — 83036 HEMOGLOBIN GLYCOSYLATED A1C: CPT | Performed by: FAMILY MEDICINE

## 2019-11-18 PROCEDURE — 99214 OFFICE O/P EST MOD 30 MIN: CPT | Performed by: FAMILY MEDICINE

## 2019-11-18 RX ORDER — OXYCODONE HCL 10 MG/1
10 TABLET, FILM COATED, EXTENDED RELEASE ORAL EVERY 12 HOURS
Qty: 60 EACH | Refills: 0 | Status: SHIPPED | OUTPATIENT
Start: 2019-12-18 | End: 2020-06-10 | Stop reason: SDUPTHER

## 2019-11-18 RX ORDER — NYSTATIN 100000 U/G
1 CREAM TOPICAL 2 TIMES DAILY
Qty: 1 TUBE | Refills: 2 | Status: SHIPPED | OUTPATIENT
Start: 2019-11-18 | End: 2019-11-23

## 2019-11-18 RX ORDER — OXYCODONE HCL 10 MG/1
10 TABLET, FILM COATED, EXTENDED RELEASE ORAL EVERY 12 HOURS
Qty: 60 EACH | Refills: 0 | Status: SHIPPED | OUTPATIENT
Start: 2019-11-18 | End: 2019-11-18 | Stop reason: SDUPTHER

## 2019-11-18 RX ORDER — OXYCODONE HYDROCHLORIDE 10 MG/1
10 TABLET ORAL 2 TIMES DAILY PRN
Qty: 60 TAB | Refills: 0 | Status: SHIPPED | OUTPATIENT
Start: 2019-12-18 | End: 2019-11-18 | Stop reason: SDUPTHER

## 2019-11-18 RX ORDER — METHOCARBAMOL 750 MG/1
750 TABLET, FILM COATED ORAL 2 TIMES DAILY PRN
Qty: 60 TAB | Refills: 6 | Status: SHIPPED | OUTPATIENT
Start: 2019-11-18 | End: 2022-02-15

## 2019-11-18 RX ORDER — OXYCODONE HCL 10 MG/1
10 TABLET, FILM COATED, EXTENDED RELEASE ORAL EVERY 12 HOURS
Qty: 60 EACH | Refills: 0 | Status: SHIPPED | OUTPATIENT
Start: 2020-01-17 | End: 2019-11-18 | Stop reason: SDUPTHER

## 2019-11-18 RX ORDER — OXYCODONE HYDROCHLORIDE 10 MG/1
10 TABLET ORAL 2 TIMES DAILY PRN
Qty: 60 TAB | Refills: 0 | Status: SHIPPED | OUTPATIENT
Start: 2020-01-17 | End: 2020-06-10 | Stop reason: SDUPTHER

## 2019-11-18 RX ORDER — HYDROCORTISONE ACETATE 25 MG/1
25 SUPPOSITORY RECTAL EVERY 12 HOURS PRN
Qty: 30 SUPPOSITORY | Refills: 6 | Status: SHIPPED | OUTPATIENT
Start: 2019-11-18 | End: 2022-02-15

## 2019-11-18 RX ORDER — OXYCODONE HYDROCHLORIDE 10 MG/1
10 TABLET ORAL 2 TIMES DAILY PRN
Qty: 60 TAB | Refills: 0 | Status: SHIPPED | OUTPATIENT
Start: 2019-11-18 | End: 2019-11-18 | Stop reason: SDUPTHER

## 2019-11-18 ASSESSMENT — PATIENT HEALTH QUESTIONNAIRE - PHQ9
1. LITTLE INTEREST OR PLEASURE IN DOING THINGS: NOT AT ALL
SUM OF ALL RESPONSES TO PHQ9 QUESTIONS 1 AND 2: 0
2. FEELING DOWN, DEPRESSED, IRRITABLE, OR HOPELESS: NOT AT ALL

## 2019-11-18 NOTE — PROGRESS NOTES
"Chief Complaint   Patient presents with   • Chronic Opiate Therapy     Oxycodone/Oxycontin   • Wrist Pain     Right wrist pain x\"a couple months\".    • Rash     Skin rash on upper abdomen.        HPI:  Patient is a 54 y.o. male established patient who presents today to discuss multiple health concerns. He has chronic non insulin dependent DMII and has been historically non compliant with most aspects of DM related items. He endorses cutting down on excessive candy intake over the pats month and is due for POC A1c today. He is very aware of the potential health risks involved with uncontrolled DMII and obesity. He also suffers from chronic diffuse pain controlled with chronic Oxycontin, Robaxin, and Roxicodone PRN. Pt reports that his pain is adequately controlled on Oxycontin and Roxicodone, and activity is improved with medication use. He does not have a history of falls and denies side effects related to narcotic use . He continues to take medication as prescribed and is not veering from agreed treatment regimen. Pt has tried weaning from narcotics in past without sustainable success. Pt has failed non narcotic treatment options in the past and benefits from daily narcotic use. Treatment goals discussed today and Opioid risk score is 1. His drug tox screen and consents are due for annual update today, and prior testing has been consistent with reported use. I have reviewed his medical records and  today, and have determined that controlled substance treatment is medically indicated at this time. He also has a small patch of rash, below his right pec area, that has not resolved with intermittent steroid cream use without known etiology. He also has chronic hemorrhoids that bleed intermittently, depending on the softness of his stool and would like a new RX for suppositories to use in case of flair up. He is also having right wrist pain and intermittent swelling that has been happening over the past couple of " months without history of trauma nor gout. He is feels that he may have a bone spur that is causing this issue and would like further evaluation. 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       Patient Active Problem List    Diagnosis Date Noted   • Chronic narcotic use 11/18/2019   • Non-compliance 06/17/2019   • Other hemorrhoids 06/17/2019   • Benign prostatic hyperplasia with incomplete bladder emptying 01/28/2019   • Mixed dyslipidemia 10/02/2017   • Irritable bowel syndrome with both constipation and diarrhea 04/24/2017   • Chronic bilateral upper abdominal pain 04/24/2017   • Type 2 diabetes mellitus without complication, without long-term current use of insulin (HCC) 04/24/2017   • Essential hypertension, benign 05/06/2014   • Obesity (BMI 30-39.9) 05/06/2014   • Rectal pain 04/03/2014   • Former smoker 11/22/2013   • Dysthymic disorder 04/19/2013   • Cervical disc disease 04/19/2013   • Male hypogonadism 04/19/2013   • IBD (inflammatory bowel disease)        Past medical, surgical, family, and social history was reviewed and updated in Epic chart by me today.     Medications and allergies reviewed and updated in Epic chart by me today.     ROS:  Pertinent positives listed above in HPI. All other systems have been reviewed and are negative.    PE:   /68 (BP Location: Left arm, Patient Position: Sitting, BP Cuff Size: Adult)   Pulse 70   Temp 36.8 °C (98.2 °F) (Temporal)   Resp 16   Ht 1.829 m (6')   Wt 113 kg (249 lb 1.9 oz)   SpO2 97%   BMI 33.79 kg/m²   Vital signs reviewed with patient.     Gen: Well developed; well nourished; no acute distress; age appropriate appearance   CV: Regular rate and rhythm; S1/ S2 present; no murmur, gallop or rub noted  Pulm: No respiratory distress; clear to ascultation b/l; no wheezing or stridor noted b/l  Abd: Adequate bowel sounds noted; soft and nontender; no  rebound, rigidity, nor distention; no hepatosplenogmegaly  Upper extremities: no gross deformities noted with exception of mild edema noted around circumference of right wrist - no overlying skin changes and no ROM limitation noted   Lower extremities: No peripheral edema b/l LE extremities/ no clubbing nor cyanosis noted  Skin: Warm and dry; mild red skin rash noted under right pec   Neuro: No focal deficits noted   Psych: AAOx4; mood and affect are appropriate    A/P:  1. Type 2 diabetes mellitus without complication, without long-term current use of insulin (HCC)  Markedly improved despite historical non compliance. POC A1c is 9.8% (12.2% 6/17/19) and encouragement provided to patient to continue diet changes. Pt continues to take oral medications and does not want any medication change done. He declines annual retina exams and monofilament exam. He has numerous family members that have lost vital organs and limbs due to diabetes but does not want aggressive treatments.   - POCT  A1C    2. Right wrist pain  New condition for patient over the past two months without known etiology. Will obtain xray of wrist for work up.   - DX-WRIST-COMPLETE 3+ RIGHT; Future    3. Chronic bilateral upper abdominal pain/  Cervical disc disease/ Chronic narcotic use  Stable/ condition controlled with chronic pain medication and Robaxin use (pt failed self wean since our last visit in August). Consent for opiate prescription UTD/ CSA and utox are due today, and prior utox is consistent with use/  reviewed without any issues noted. Pt is to continue current pain medication routine. Pt is well versed in safe use of narcotic medications and has no perceived side effects. He is also well versed in obtaining Narcan if unintentional overdose occurs. 3 x 1 month RX provided to patient at visit today for each medication.   - Controlled Substance Treatment Agreement  - PAIN MANAGEMENT SCRN, [facility]; Future  - oxyCODONE CR (OXYCONTIN) 10 MG  Tablet Extended Release 12 hour Abuse-Deterrent; Take 1 Tab by mouth every 12 hours for 30 days.  Dispense: 60 Each; Refill: 0  - oxyCODONE immediate release (ROXICODONE) 10 MG immediate release tablet; Take 1 Tab by mouth 2 times a day as needed for Moderate Pain for up to 30 days.  Dispense: 60 Tab; Refill: 0    4. Skin rash  Skin rash is mild but still present despite prior Kenalog use. Concern about fungal origin so will trial Nystatin cream and follow clinical response. New RX sent to pharmacy.   - nystatin (MYCOSTATIN) 589656 UNIT/GM Cream topical cream; Apply 1 g to affected area(s) 2 times a day for 5 days.  Dispense: 1 Tube; Refill: 2    5. Other hemorrhoids  Intermittent issue for patient - he has used these suppositories in the past with good results   - hydrocortisone (ANUSOL-HC) 25 MG Suppos; Insert 1 Suppository in rectum every 12 hours as needed (hemorrhoid pain).  Dispense: 30 Suppository; Refill: 6    6. Obesity (BMI 30-39.9)  - Patient identified as having weight management issue.  Appropriate orders and counseling given.

## 2019-11-24 DIAGNOSIS — K58.2 IRRITABLE BOWEL SYNDROME WITH BOTH CONSTIPATION AND DIARRHEA: ICD-10-CM

## 2019-11-25 RX ORDER — MESALAMINE 1.2 G/1
TABLET, DELAYED RELEASE ORAL
Qty: 360 TAB | Refills: 1 | Status: SHIPPED | OUTPATIENT
Start: 2019-11-25 | End: 2020-07-01 | Stop reason: SDUPTHER

## 2020-01-06 DIAGNOSIS — F34.1 DYSTHYMIC DISORDER: Chronic | ICD-10-CM

## 2020-01-06 RX ORDER — BUPROPION HYDROCHLORIDE 300 MG/1
300 TABLET ORAL EVERY MORNING
Qty: 90 TAB | Refills: 3 | Status: SHIPPED | OUTPATIENT
Start: 2020-01-06 | End: 2020-12-23 | Stop reason: SDUPTHER

## 2020-01-20 DIAGNOSIS — E11.9 TYPE 2 DIABETES MELLITUS WITHOUT COMPLICATION, WITHOUT LONG-TERM CURRENT USE OF INSULIN (HCC): ICD-10-CM

## 2020-01-20 RX ORDER — METFORMIN HYDROCHLORIDE 500 MG/1
TABLET, EXTENDED RELEASE ORAL
Qty: 360 TAB | Refills: 3 | Status: SHIPPED | OUTPATIENT
Start: 2020-01-20 | End: 2020-12-23 | Stop reason: SDUPTHER

## 2020-03-30 DIAGNOSIS — N40.1 BENIGN PROSTATIC HYPERPLASIA WITH INCOMPLETE BLADDER EMPTYING: ICD-10-CM

## 2020-03-30 DIAGNOSIS — E78.5 DYSLIPIDEMIA: ICD-10-CM

## 2020-03-30 DIAGNOSIS — R39.14 BENIGN PROSTATIC HYPERPLASIA WITH INCOMPLETE BLADDER EMPTYING: ICD-10-CM

## 2020-03-30 RX ORDER — SIMVASTATIN 40 MG
TABLET ORAL
Qty: 90 TAB | Refills: 3 | Status: SHIPPED | OUTPATIENT
Start: 2020-03-30 | End: 2021-03-23 | Stop reason: SDUPTHER

## 2020-03-30 RX ORDER — FINASTERIDE 5 MG/1
5 TABLET, FILM COATED ORAL DAILY
Qty: 90 TAB | Refills: 3 | Status: SHIPPED | OUTPATIENT
Start: 2020-03-30 | End: 2021-03-23 | Stop reason: SDUPTHER

## 2020-05-13 ENCOUNTER — TELEMEDICINE (OUTPATIENT)
Dept: INTERNAL MEDICINE | Facility: IMAGING CENTER | Age: 55
End: 2020-05-13
Payer: COMMERCIAL

## 2020-05-13 DIAGNOSIS — F11.90 CHRONIC NARCOTIC USE: ICD-10-CM

## 2020-05-13 DIAGNOSIS — E11.9 TYPE 2 DIABETES MELLITUS WITHOUT COMPLICATION, WITHOUT LONG-TERM CURRENT USE OF INSULIN (HCC): Chronic | ICD-10-CM

## 2020-05-13 DIAGNOSIS — K64.8 OTHER HEMORRHOIDS: Chronic | ICD-10-CM

## 2020-05-13 DIAGNOSIS — Z00.00 HEALTH CARE MAINTENANCE: ICD-10-CM

## 2020-05-13 DIAGNOSIS — I10 ESSENTIAL HYPERTENSION, BENIGN: Chronic | ICD-10-CM

## 2020-05-13 DIAGNOSIS — K62.89 CHRONIC RECTAL PAIN: ICD-10-CM

## 2020-05-13 DIAGNOSIS — G89.29 CHRONIC RECTAL PAIN: ICD-10-CM

## 2020-05-13 DIAGNOSIS — E78.2 MIXED DYSLIPIDEMIA: Chronic | ICD-10-CM

## 2020-05-13 DIAGNOSIS — K52.9 IBD (INFLAMMATORY BOWEL DISEASE): Chronic | ICD-10-CM

## 2020-05-13 PROCEDURE — 99214 OFFICE O/P EST MOD 30 MIN: CPT | Mod: 95 | Performed by: FAMILY MEDICINE

## 2020-05-13 NOTE — PROGRESS NOTES
Telemedicine Visit: Established Patient     This encounter was conducted via [employer] - patient unable to access Zoom.  Verbal consent was obtained. Patient's identity was verified.    Subjective:     Chief Complaint   Patient presents with   • Chronic Opiate Therapy       HPI:  Patient is a 55 y.o. male established patient who presents today to discuss his ongoing chronic opiate use. He suffers from IBS and chronic rectal pain (sometimes affecting entire abdomen as well) and has been using Oxy IR and ER for pain control since at least 2013. Pt reports that his pain is adequately controlled on Oxycontin and Roxicodone, and activity is improved with medication use. He does not have a history of falls and denies side effects related to narcotic use . He continues to take medication as prescribed and is not veering from agreed treatment regimen.   Pt has tried weaning from narcotics in past without sustainable success. Pt has failed non narcotic treatment options in the past and benefits from daily narcotic use. Treatment goals discussed today and Opioid risk score is 1. His drug tox screen and consents are UTD and consistent with reported use. I have reviewed his medical records and  today, and have determined that controlled substance treatment is medically indicated at this time.  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 We have spoken numerous times about the need for him to establish local medical care in [place] but he has failed to to so. He understands that his narcotic prescriptions need to be filled within the state of [place].  He had a failed colonoscopy (poor prep) and hemorrhoid banding done by Dr. Mitchell in [place] earlier this year, and patient has a follow up appointment with him in June to discuss the next steps.     He is historically non compliant with all aspects of care  related to chronic obesity and chronic non insulin dependent DMII. He is overdue for fasting labs and would prefer not to get them done at this time in [place]. He denies other health concerns and is in good spirits today.     Patient Active Problem List    Diagnosis Date Noted   • Chronic rectal pain 05/13/2020   • Chronic narcotic use 11/18/2019   • Non-compliance 06/17/2019   • Other hemorrhoids 06/17/2019   • Benign prostatic hyperplasia with incomplete bladder emptying 01/28/2019   • Mixed dyslipidemia 10/02/2017   • Irritable bowel syndrome with both constipation and diarrhea 04/24/2017   • Chronic bilateral upper abdominal pain 04/24/2017   • Type 2 diabetes mellitus without complication, without long-term current use of insulin (HCC) 04/24/2017   • Essential hypertension, benign 05/06/2014   • Obesity (BMI 30-39.9) 05/06/2014   • Former smoker 11/22/2013   • Dysthymic disorder 04/19/2013   • Cervical disc disease 04/19/2013   • Male hypogonadism 04/19/2013   • IBD (inflammatory bowel disease)        Past medical, surgical, family, and social history was reviewed and updated in Epic chart by me today.     Medications and allergies reviewed and updated in Epic chart by me today.     ROS:  Pertinent positives listed above in HPI. All other systems have been reviewed and are negative.     Objective:   Patient unable to provide me with vital signs today.     Physical Exam:  Constitutional: Alert, no distress, non toxic  Skin: No rashes in visible areas.  Eye: Round. Conjunctiva clear, lids normal. No icterus.   ENMT: Lips pink without lesions, good dentition, moist mucous membranes. Phonation normal.  Neck: Moves freely without pain.  Respiratory: Unlabored respiratory effort, no cough or audible wheeze  Psych: Alert and oriented x3, normal affect and mood.       Assessment and Plan:   The following treatment plan was discussed:     A/P:  1. Chronic narcotic use  Stable/ condition controlled with chronic pain  medication and Robaxin use (pt has failed prior weaning attempts). Consent for opiate prescription reviewed and signed by patient today/ CSA and utox are UTD and consistent with use/  reviewed without any issues noted. Pt is to continue current pain medication routine and is aware that he must fill narcotic prescriptions within the state of [place]. Pt is well versed in safe use of narcotic medications and has no perceived side effects. He is also well versed in obtaining Narcan if unintentional overdose occurs. He will consider his options during the COVID-19 crisis and keep me informed.     2. IBD (inflammatory bowel disease)  Stable with ongoing medication use. He had a failed colonoscopy earlier this year with Dr. Mitchell in [place], [place] and has follow up visit with him in June. Radha RAIN will request reports for our records.     3. Chronic rectal pain  Stable with ongoing medication and narcotic use.     4. Other hemorrhoids  Patient underwent banding at time of failed colonoscopy and reports that banding lasted six days. He has follow up appt with Dr. Mitchell next month to discuss further options.     5. Type 2 diabetes mellitus without complication, without long-term current use of insulin (HCC)  Uncontrolled chronically - last HgA1c was 9.8% (11/18/19) and patient is due for repeat lab and urine testing. Pt continues to take oral medications daily and is not interested in any medication nor lifestyle changes. He declines annual retina exams and monofilament exam. He has numerous family members that have lost vital organs and limbs due to diabetes and does not want to be at all compliant with aggressive treatment plans to improve this condition.    - HEMOGLOBIN A1C; Future  - MICROALBUMIN CREAT RATIO URINE (LAB COLLECT); Future    6. Essential hypertension, benign  Patient is to continue daily Lisinopril use and is due for fasting labs.   - Comp Metabolic Panel; Future  - CBC WITH DIFFERENTIAL; Future    7. Mixed  dyslipidemia  Patient is to continue nightly Zocor use and is due for fasting lipid panel.   - Lipid Profile; Future    8. Health care maintenance  Patient is due for annual fasting labs, and I will mail orders to his home in [place]. Patient encouraged to obtain fasting labs when he feels safe to do so - very fearful of going to lab during COVID-19 crisis.   - TSH WITH REFLEX TO FT4; Future  - VITAMIN D,25 HYDROXY; Future       Follow-up: PRN

## 2020-06-10 DIAGNOSIS — F11.90 CHRONIC NARCOTIC USE: ICD-10-CM

## 2020-06-10 DIAGNOSIS — R10.11 CHRONIC BILATERAL UPPER ABDOMINAL PAIN: Chronic | ICD-10-CM

## 2020-06-10 DIAGNOSIS — R10.12 CHRONIC BILATERAL UPPER ABDOMINAL PAIN: Chronic | ICD-10-CM

## 2020-06-10 DIAGNOSIS — M50.90 CERVICAL DISC DISEASE: Chronic | ICD-10-CM

## 2020-06-10 DIAGNOSIS — G89.29 CHRONIC BILATERAL UPPER ABDOMINAL PAIN: Chronic | ICD-10-CM

## 2020-06-10 RX ORDER — OXYCODONE HYDROCHLORIDE 10 MG/1
10 TABLET ORAL 2 TIMES DAILY PRN
Qty: 60 TAB | Refills: 0 | Status: SHIPPED | OUTPATIENT
Start: 2020-06-10 | End: 2020-07-10

## 2020-06-10 RX ORDER — OXYCODONE HCL 10 MG/1
10 TABLET, FILM COATED, EXTENDED RELEASE ORAL EVERY 12 HOURS
Qty: 60 EACH | Refills: 0 | Status: SHIPPED | OUTPATIENT
Start: 2020-06-10 | End: 2020-07-10

## 2020-06-11 DIAGNOSIS — R10.11 CHRONIC BILATERAL UPPER ABDOMINAL PAIN: Chronic | ICD-10-CM

## 2020-06-11 DIAGNOSIS — R10.12 CHRONIC BILATERAL UPPER ABDOMINAL PAIN: Chronic | ICD-10-CM

## 2020-06-11 DIAGNOSIS — M50.90 CERVICAL DISC DISEASE: Chronic | ICD-10-CM

## 2020-06-11 DIAGNOSIS — G89.29 CHRONIC BILATERAL UPPER ABDOMINAL PAIN: Chronic | ICD-10-CM

## 2020-06-11 DIAGNOSIS — F11.90 CHRONIC NARCOTIC USE: ICD-10-CM

## 2020-06-11 RX ORDER — OXYCODONE HCL 10 MG/1
10 TABLET, FILM COATED, EXTENDED RELEASE ORAL EVERY 12 HOURS
Qty: 60 EACH | Refills: 0 | Status: CANCELLED | OUTPATIENT
Start: 2020-06-11 | End: 2020-07-11

## 2020-06-11 RX ORDER — OXYCODONE HYDROCHLORIDE 10 MG/1
10 TABLET ORAL 2 TIMES DAILY PRN
Qty: 60 TAB | Refills: 0 | Status: CANCELLED | OUTPATIENT
Start: 2020-06-11 | End: 2020-07-11

## 2020-07-01 DIAGNOSIS — K58.2 IRRITABLE BOWEL SYNDROME WITH BOTH CONSTIPATION AND DIARRHEA: ICD-10-CM

## 2020-07-01 RX ORDER — DAPAGLIFLOZIN 10 MG/1
1 TABLET, FILM COATED ORAL
Qty: 90 TAB | Refills: 3 | Status: SHIPPED | OUTPATIENT
Start: 2020-07-01 | End: 2021-08-04 | Stop reason: SDUPTHER

## 2020-07-01 RX ORDER — MESALAMINE 1.2 G/1
TABLET, DELAYED RELEASE ORAL
Qty: 360 TAB | Refills: 2 | Status: SHIPPED | OUTPATIENT
Start: 2020-07-01 | End: 2021-03-23 | Stop reason: SDUPTHER

## 2020-07-02 DIAGNOSIS — I10 ESSENTIAL HYPERTENSION, BENIGN: ICD-10-CM

## 2020-07-02 RX ORDER — LISINOPRIL 40 MG/1
TABLET ORAL
Qty: 90 TAB | Refills: 3 | Status: SHIPPED | OUTPATIENT
Start: 2020-07-02 | End: 2021-06-21 | Stop reason: SDUPTHER

## 2020-07-13 DIAGNOSIS — R10.12 CHRONIC BILATERAL UPPER ABDOMINAL PAIN: ICD-10-CM

## 2020-07-13 DIAGNOSIS — G89.29 CHRONIC BILATERAL UPPER ABDOMINAL PAIN: Chronic | ICD-10-CM

## 2020-07-13 DIAGNOSIS — M50.90 CERVICAL DISC DISEASE: Chronic | ICD-10-CM

## 2020-07-13 DIAGNOSIS — F11.90 CHRONIC NARCOTIC USE: ICD-10-CM

## 2020-07-13 DIAGNOSIS — R10.11 CHRONIC BILATERAL UPPER ABDOMINAL PAIN: ICD-10-CM

## 2020-07-13 DIAGNOSIS — M50.90 CERVICAL DISC DISEASE: ICD-10-CM

## 2020-07-13 DIAGNOSIS — F11.20 CHRONIC NARCOTIC DEPENDENCE (HCC): ICD-10-CM

## 2020-07-13 DIAGNOSIS — R10.12 CHRONIC BILATERAL UPPER ABDOMINAL PAIN: Chronic | ICD-10-CM

## 2020-07-13 DIAGNOSIS — G89.29 CHRONIC BILATERAL UPPER ABDOMINAL PAIN: ICD-10-CM

## 2020-07-13 DIAGNOSIS — R10.11 CHRONIC BILATERAL UPPER ABDOMINAL PAIN: Chronic | ICD-10-CM

## 2020-07-13 RX ORDER — OXYCODONE HYDROCHLORIDE 10 MG/1
10 TABLET ORAL 2 TIMES DAILY PRN
Qty: 60 TAB | Refills: 0 | Status: SHIPPED | OUTPATIENT
Start: 2020-07-13 | End: 2020-08-12

## 2020-07-13 RX ORDER — OXYCODONE HCL 10 MG/1
10 TABLET, FILM COATED, EXTENDED RELEASE ORAL EVERY 12 HOURS
Qty: 60 EACH | Refills: 1 | OUTPATIENT
Start: 2020-07-13 | End: 2020-08-12

## 2020-07-13 RX ORDER — OXYCODONE HCL 10 MG/1
10 TABLET, FILM COATED, EXTENDED RELEASE ORAL EVERY 12 HOURS
Qty: 60 TAB | Refills: 0 | Status: SHIPPED | OUTPATIENT
Start: 2020-08-12 | End: 2020-09-11

## 2020-07-13 RX ORDER — OXYCODONE HYDROCHLORIDE 10 MG/1
10 TABLET ORAL 2 TIMES DAILY PRN
Qty: 60 TAB | Refills: 0 | Status: SHIPPED | OUTPATIENT
Start: 2020-08-12 | End: 2020-09-11

## 2020-07-13 RX ORDER — OXYCODONE HYDROCHLORIDE 10 MG/1
10 TABLET ORAL 2 TIMES DAILY PRN
Qty: 60 TAB | Refills: 1 | OUTPATIENT
Start: 2020-07-13 | End: 2020-08-12

## 2020-07-13 RX ORDER — OXYCODONE HCL 10 MG/1
10 TABLET, FILM COATED, EXTENDED RELEASE ORAL EVERY 12 HOURS
Qty: 60 TAB | Refills: 0 | Status: SHIPPED | OUTPATIENT
Start: 2020-07-13 | End: 2020-08-12

## 2020-08-13 ENCOUNTER — TELEMEDICINE (OUTPATIENT)
Dept: INTERNAL MEDICINE | Facility: IMAGING CENTER | Age: 55
End: 2020-08-13
Payer: COMMERCIAL

## 2020-08-13 DIAGNOSIS — B07.8 FLAT WART: ICD-10-CM

## 2020-08-13 DIAGNOSIS — L29.9 ITCHY SCALP: ICD-10-CM

## 2020-08-13 PROCEDURE — 99213 OFFICE O/P EST LOW 20 MIN: CPT | Mod: 95,CR | Performed by: FAMILY MEDICINE

## 2020-08-13 NOTE — PROGRESS NOTES
"Telemedicine Visit: Established Patient     This evaluation was conducted via [employer], using secure and encrypted videoconferencing technology.  The patient was physically located at Home in [place], [place] and the physician was located at [facility].  The patient's identity was confirmed and verbal consent for the telemedicine encounter was obtained.      Subjective:     Chief Complaint   Patient presents with   • Skin Lesion     patient reports having itchy scalp and concerns about flat warts all over body       HPI:  Patient is a 55 y.o. male established patient who presents today to discuss new itchy scalp and concern about flat warts disseminating on scalp and other areas of body. He has used Aldara on flat warts in past but is seeking a different treatment. He endorses that he has been \"mindlessly picking and itching\" his scalp and has not tried OTC anti-itch shampoo for this condition. He continues to stay in [place], [place] and reports that he is not eating well (mainly eating TV dinners) nor exercising. He is feeling better after having his hemorrhoid banding done there locally and denies other new concerns.     Patient Active Problem List    Diagnosis Date Noted   • Chronic rectal pain 05/13/2020   • Chronic narcotic use 11/18/2019   • Non-compliance 06/17/2019   • Other hemorrhoids 06/17/2019   • Benign prostatic hyperplasia with incomplete bladder emptying 01/28/2019   • Mixed dyslipidemia 10/02/2017   • Irritable bowel syndrome with both constipation and diarrhea 04/24/2017   • Chronic bilateral upper abdominal pain 04/24/2017   • Type 2 diabetes mellitus without complication, without long-term current use of insulin (HCC) 04/24/2017   • Essential hypertension, benign 05/06/2014   • Obesity (BMI 30-39.9) 05/06/2014   • Former smoker 11/22/2013   • Dysthymic disorder 04/19/2013   • Cervical disc disease 04/19/2013   • Male hypogonadism 04/19/2013   • IBD (inflammatory bowel disease)        Past " medical, surgical, family, and social history was reviewed and updated in Epic chart by me today.     Medications and allergies reviewed and updated in Epic chart by me today.     ROS:  Pertinent positives listed above in HPI. All other systems have been reviewed and are negative.     Objective:   Patient unable to provide vital signs for me today.    Physical Exam:   Constitutional: Alert, no distress, well-groomed.  Skin: No rashes in visible areas.  Eye: Round. Conjunctiva clear, lids normal. No icterus.   ENMT: Lips pink without lesions, good dentition, moist mucous membranes. Phonation normal.  Neck: Moves freely without pain.  Respiratory: Unlabored respiratory effort, no cough or audible wheeze  Psych: Alert and oriented x3, normal affect and mood.   Due to poor connection and lack of focus on screen, unable to evaluate skin appropriately     Assessment and Plan:   The following treatment plan was discussed:     A/P:  1. Itchy scalp  New condition for patient - discussed patient try T/Gel Stubborn Itch Shampoo per directions to decrease scalp irritation and hopefully decrease mindless itching/picking.     2. Flat wart  Patient reports that he feels flat warts have disseminated over different parts of his body and has used Aldara in the past. I recommended that he see dermatologist in [place] for appropriate skin evaluation and treatment. Patient declines referral and does not want to see Dermatology for this condition.    Face to face time: 15 minutes spent with greater than 50% of time spent with direct patient care and counseling about diagnosis, prognosis, risk versus benefits of treatment, and importance of compliance with instructions. All questions answered and support provided during visit today.       Follow-up: PRN - patient encouraged to come for office visit with me the next time he is in [place].

## 2020-09-24 DIAGNOSIS — E11.9 TYPE 2 DIABETES MELLITUS WITHOUT COMPLICATION, WITHOUT LONG-TERM CURRENT USE OF INSULIN (HCC): ICD-10-CM

## 2020-09-24 RX ORDER — NATEGLINIDE 120 MG/1
TABLET ORAL
Qty: 360 TAB | Refills: 3 | Status: SHIPPED | OUTPATIENT
Start: 2020-09-24 | End: 2021-08-25 | Stop reason: SDUPTHER

## 2020-12-10 ENCOUNTER — TELEMEDICINE (OUTPATIENT)
Dept: INTERNAL MEDICINE | Facility: IMAGING CENTER | Age: 55
End: 2020-12-10
Payer: COMMERCIAL

## 2020-12-10 DIAGNOSIS — L08.9 SKIN INFLAMMATION: ICD-10-CM

## 2020-12-10 DIAGNOSIS — K64.8 OTHER HEMORRHOIDS: Chronic | ICD-10-CM

## 2020-12-10 DIAGNOSIS — R10.12 CHRONIC BILATERAL UPPER ABDOMINAL PAIN: Chronic | ICD-10-CM

## 2020-12-10 DIAGNOSIS — K52.9 IBD (INFLAMMATORY BOWEL DISEASE): Chronic | ICD-10-CM

## 2020-12-10 DIAGNOSIS — Z00.00 HEALTH CARE MAINTENANCE: ICD-10-CM

## 2020-12-10 DIAGNOSIS — R10.11 CHRONIC BILATERAL UPPER ABDOMINAL PAIN: Chronic | ICD-10-CM

## 2020-12-10 DIAGNOSIS — G89.29 CHRONIC BILATERAL UPPER ABDOMINAL PAIN: Chronic | ICD-10-CM

## 2020-12-10 DIAGNOSIS — F11.20 CHRONIC NARCOTIC DEPENDENCE (HCC): ICD-10-CM

## 2020-12-10 DIAGNOSIS — Z12.5 SCREENING FOR PROSTATE CANCER: ICD-10-CM

## 2020-12-10 DIAGNOSIS — E78.2 MIXED DYSLIPIDEMIA: Chronic | ICD-10-CM

## 2020-12-10 DIAGNOSIS — E11.9 TYPE 2 DIABETES MELLITUS WITHOUT COMPLICATION, WITHOUT LONG-TERM CURRENT USE OF INSULIN (HCC): Chronic | ICD-10-CM

## 2020-12-10 DIAGNOSIS — K62.89 CHRONIC RECTAL PAIN: ICD-10-CM

## 2020-12-10 DIAGNOSIS — G89.29 CHRONIC RECTAL PAIN: ICD-10-CM

## 2020-12-10 DIAGNOSIS — I10 ESSENTIAL HYPERTENSION, BENIGN: Chronic | ICD-10-CM

## 2020-12-10 DIAGNOSIS — A63.0 CONDYLOMA ACUMINATA: ICD-10-CM

## 2020-12-10 PROCEDURE — 99214 OFFICE O/P EST MOD 30 MIN: CPT | Mod: 95,CR | Performed by: FAMILY MEDICINE

## 2020-12-11 RX ORDER — IMIQUIMOD 12.5 MG/.25G
CREAM TOPICAL
Qty: 36 EACH | Refills: 6 | Status: SHIPPED | OUTPATIENT
Start: 2020-12-11 | End: 2022-02-15

## 2020-12-11 RX ORDER — TRIAMCINOLONE ACETONIDE 1 MG/G
1 CREAM TOPICAL 2 TIMES DAILY
Qty: 15 G | Refills: 2 | Status: SHIPPED | OUTPATIENT
Start: 2020-12-11 | End: 2020-12-18

## 2020-12-14 DIAGNOSIS — F11.20 CHRONIC NARCOTIC DEPENDENCE (HCC): ICD-10-CM

## 2020-12-14 NOTE — PROGRESS NOTES
Virtual Visit: Established Patient   This visit was conducted via Zoom using secure and encrypted videoconferencing technology. The patient was in a private location in the state of [place], and physician was at [facility].  The patient's identity was confirmed and verbal consent was obtained for this virtual visit.    Subjective:     Chief Complaint   Patient presents with   • Chronic Opiate Therapy       HPI:  Patient is a 55 y.o. male established patient who presents today to obtain new narcotic prescriptions. He suffers from IBS and chronic rectal pain (sometimes affecting entire abdomen as well) and has been using Oxy IR and ER for pain control since at least 2013. Pt reports that his pain is adequately controlled on Oxycontin and Roxicodone, and activity is improved with medication use. He does not have a history of falls and denies side effects related to narcotic use . He continues to take medication as prescribed and is not veering from agreed treatment regimen. Pt has tried weaning from narcotics in past and using more appropriate IBS targeted medications without sustainable success. Pt declines trying newer IBS medications and treatment goals discussed today. Opioid risk score is 1. His drug tox screen and consents are due for updates today, and prior utox  consistent with reported use. I have reviewed his medical records and  today, and have determined that controlled substance treatment is medically indicated at this time.      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 We have spoken numerous times about the need for him to establish local medical care in [place] but he has failed to to so. He understands that his narcotic prescriptions need to be filled within the state of [place].     He continues to suffer from large hemorrhoids, and I encouraged him to follow-up  "with his local GI doctor in [place] for surgical options. He has been non compliant obtaining labs ordered earlier this year and has long standing history of general non compliance related to all health matters.      He reports that he has lost 30 lbs while in quarantine and states \"that his wife thinks he has cancer\" but will not elaborate further during our visit.     Patient Active Problem List    Diagnosis Date Noted   • Chronic rectal pain 05/13/2020   • Chronic narcotic use 11/18/2019   • Non-compliance 06/17/2019   • Other hemorrhoids 06/17/2019   • Benign prostatic hyperplasia with incomplete bladder emptying 01/28/2019   • Mixed dyslipidemia 10/02/2017   • Irritable bowel syndrome with both constipation and diarrhea 04/24/2017   • Chronic bilateral upper abdominal pain 04/24/2017   • Type 2 diabetes mellitus without complication, without long-term current use of insulin (HCC) 04/24/2017   • Essential hypertension, benign 05/06/2014   • Obesity (BMI 30-39.9) 05/06/2014   • Former smoker 11/22/2013   • Dysthymic disorder 04/19/2013   • Cervical disc disease 04/19/2013   • Male hypogonadism 04/19/2013   • IBD (inflammatory bowel disease)        Past medical, surgical, family, and social history was reviewed and updated in Epic chart by me today.     Medications and allergies reviewed and updated in Epic chart by me today.     ROS:  Pertinent positives listed above in HPI. All other systems have been reviewed and are negative.     Objective:   Patient unable to provide me with vital signs during our visit but he does look thinner today (reported 30 lb weight loss)    Physical Exam:  Constitutional: Alert, no distress, well-groomed.  Skin: No rashes in visible areas.  Eye: Round. Conjunctiva clear, lids normal. No icterus.   ENMT: Lips pink without lesions, good dentition, moist mucous membranes. Phonation normal.  Neck: Moves freely without pain.  Respiratory: Unlabored respiratory effort, no cough or audible " wheeze  Psych: Alert and oriented x3, baselin affect and mood.     Assessment and Plan:   The following treatment plan was discussed:     A/P:  1. Chronic narcotic dependence (HCC)/ IBD (inflammatory bowel disease)/ Chronic rectal pain/Chronic bilateral upper abdominal pain   Ongoing issue for patient since at least 2013. Patient has seen Dr. Mitchell in [place], [place] for ongoing GI care but remains resistant to using more appropriate IBS control medications (failed use in past). Patient does not want to use Oxycontin 10 mg BID moving forward (which I feel is an excellent step to hopefully wean him in future) but is requesting Roxicodone 10 mg to use every 1-2 hours. His stated intent is to take a pill after every meal or snack to control food related pain and IBS symptoms. We discussed that using narcotics in this manner is not safe nor medically indicated. We agreed for patient to have Roxicodone 10 mg QID PRN for symptom control (#120 tabs per 30 days), and I will send RX to pharmacy on 12/16/2020 per his request. Patient is aware of need to update drug screen and consents - will mail orders to him today.   - Consent for Opiate Prescription  - Controlled Substance Treatment Agreement    2. Other hemorrhoids  Ongoing issue for patient and I encouraged him to follow-up with Dr. Mitchell in [place] for surgical options.     3. Essential hypertension, benign  Patient is due for fasting labs for ongoing medical management.   - Comp Metabolic Panel; Future  - CBC WITH DIFFERENTIAL; Future    4. Type 2 diabetes mellitus without complication, without long-term current use of insulin (HCC)  Current compliance with oral diabetic medications is unknown. Patient is overdue for HgA1c and microalbumin level, and ADA diet encouraged.   - HEMOGLOBIN A1C; Future  - MICROALBUMIN CREAT RATIO URINE (LAB COLLECT); Future    5. Mixed dyslipidemia  Current compliance with Zocor use is unknown and patient is due for fasting lipid panel.  - Lipid  Profile; Future    6. Screening for prostate cancer  - PROSTATE SPECIFIC AG SCREENING; Future    7. Skin inflammation  Patient suffers from intermittent skin patch inflammation and uses Kenalog PRN for symptom control. New RX sent to pharmacy.   - triamcinolone acetonide (KENALOG) 0.1 % Cream; Apply 1 Application topically 2 times a day for 7 days.  Dispense: 15 g; Refill: 2    8. Condyloma acuminata  Patient suffers from condyloma at times well controlled with ongoing Aldara use. New RX sent to pharmacy.   - imiquimod (ALDARA) 5 % cream; Apply a thin layer to affected areas before bedtime 3 times week; wash off 6-10 hours later.  Dispense: 36 Each; Refill: 6    9. Health care maintenance  Patient has long standing issues with non compliance. He is due for annual labs and is aware of other overdue HCM items.   - TSH WITH REFLEX TO FT4; Future  - VITAMIN D,25 HYDROXY; Future          Follow-up: PRN

## 2020-12-15 DIAGNOSIS — G89.29 CHRONIC RECTAL PAIN: ICD-10-CM

## 2020-12-15 DIAGNOSIS — F11.90 CHRONIC NARCOTIC USE: ICD-10-CM

## 2020-12-15 DIAGNOSIS — R10.12 CHRONIC BILATERAL UPPER ABDOMINAL PAIN: Chronic | ICD-10-CM

## 2020-12-15 DIAGNOSIS — R10.11 CHRONIC BILATERAL UPPER ABDOMINAL PAIN: Chronic | ICD-10-CM

## 2020-12-15 DIAGNOSIS — G89.29 CHRONIC BILATERAL UPPER ABDOMINAL PAIN: Chronic | ICD-10-CM

## 2020-12-15 DIAGNOSIS — K58.2 IRRITABLE BOWEL SYNDROME WITH BOTH CONSTIPATION AND DIARRHEA: Chronic | ICD-10-CM

## 2020-12-15 DIAGNOSIS — K62.89 CHRONIC RECTAL PAIN: ICD-10-CM

## 2020-12-15 RX ORDER — OXYCODONE HYDROCHLORIDE 10 MG/1
10 TABLET ORAL EVERY 6 HOURS PRN
Qty: 120 TAB | Refills: 0 | Status: SHIPPED | OUTPATIENT
Start: 2020-12-15 | End: 2021-01-14

## 2020-12-15 RX ORDER — OXYCODONE HYDROCHLORIDE 10 MG/1
10 TABLET ORAL EVERY 6 HOURS PRN
Qty: 120 TAB | Refills: 0 | Status: SHIPPED | OUTPATIENT
Start: 2021-01-14 | End: 2021-02-13

## 2020-12-15 RX ORDER — OXYCODONE HYDROCHLORIDE 10 MG/1
10 TABLET ORAL EVERY 6 HOURS PRN
Qty: 120 TAB | Refills: 0 | Status: SHIPPED | OUTPATIENT
Start: 2021-02-13 | End: 2021-03-15

## 2020-12-23 DIAGNOSIS — F34.1 DYSTHYMIC DISORDER: Chronic | ICD-10-CM

## 2020-12-23 DIAGNOSIS — E11.9 TYPE 2 DIABETES MELLITUS WITHOUT COMPLICATION, WITHOUT LONG-TERM CURRENT USE OF INSULIN (HCC): ICD-10-CM

## 2020-12-23 RX ORDER — METFORMIN HYDROCHLORIDE 500 MG/1
TABLET, EXTENDED RELEASE ORAL
Qty: 360 TAB | Refills: 3 | Status: SHIPPED | OUTPATIENT
Start: 2020-12-23 | End: 2021-08-25 | Stop reason: SDUPTHER

## 2020-12-23 RX ORDER — BUPROPION HYDROCHLORIDE 300 MG/1
300 TABLET ORAL EVERY MORNING
Qty: 90 TAB | Refills: 3 | Status: SHIPPED | OUTPATIENT
Start: 2020-12-23 | End: 2021-08-25 | Stop reason: SDUPTHER

## 2020-12-28 DIAGNOSIS — E11.9 TYPE 2 DIABETES MELLITUS WITHOUT COMPLICATION, WITHOUT LONG-TERM CURRENT USE OF INSULIN (HCC): ICD-10-CM

## 2021-03-23 DIAGNOSIS — N40.1 BENIGN PROSTATIC HYPERPLASIA WITH INCOMPLETE BLADDER EMPTYING: ICD-10-CM

## 2021-03-23 DIAGNOSIS — E78.5 DYSLIPIDEMIA: ICD-10-CM

## 2021-03-23 DIAGNOSIS — K58.2 IRRITABLE BOWEL SYNDROME WITH BOTH CONSTIPATION AND DIARRHEA: ICD-10-CM

## 2021-03-23 DIAGNOSIS — R39.14 BENIGN PROSTATIC HYPERPLASIA WITH INCOMPLETE BLADDER EMPTYING: ICD-10-CM

## 2021-03-23 RX ORDER — SIMVASTATIN 40 MG
TABLET ORAL
Qty: 90 TABLET | Refills: 0 | Status: SHIPPED | OUTPATIENT
Start: 2021-03-23 | End: 2021-08-26 | Stop reason: SDUPTHER

## 2021-03-23 RX ORDER — FINASTERIDE 5 MG/1
5 TABLET, FILM COATED ORAL DAILY
Qty: 90 TABLET | Refills: 0 | Status: SHIPPED | OUTPATIENT
Start: 2021-03-23 | End: 2021-06-21

## 2021-03-23 RX ORDER — MESALAMINE 1.2 G/1
TABLET, DELAYED RELEASE ORAL
Qty: 360 TABLET | Refills: 0 | Status: SHIPPED | OUTPATIENT
Start: 2021-03-23 | End: 2021-06-21

## 2021-06-21 DIAGNOSIS — N40.1 BENIGN PROSTATIC HYPERPLASIA WITH INCOMPLETE BLADDER EMPTYING: ICD-10-CM

## 2021-06-21 DIAGNOSIS — K58.2 IRRITABLE BOWEL SYNDROME WITH BOTH CONSTIPATION AND DIARRHEA: ICD-10-CM

## 2021-06-21 DIAGNOSIS — R39.14 BENIGN PROSTATIC HYPERPLASIA WITH INCOMPLETE BLADDER EMPTYING: ICD-10-CM

## 2021-06-21 DIAGNOSIS — I10 ESSENTIAL HYPERTENSION, BENIGN: ICD-10-CM

## 2021-06-21 DIAGNOSIS — E78.5 DYSLIPIDEMIA: ICD-10-CM

## 2021-06-21 RX ORDER — SIMVASTATIN 40 MG
TABLET ORAL
Qty: 90 TABLET | Refills: 0 | OUTPATIENT
Start: 2021-06-21

## 2021-06-21 RX ORDER — LISINOPRIL 40 MG/1
TABLET ORAL
Qty: 90 TABLET | Refills: 0 | Status: SHIPPED | OUTPATIENT
Start: 2021-06-21 | End: 2021-08-25 | Stop reason: SDUPTHER

## 2021-06-21 RX ORDER — MESALAMINE 1.2 G/1
TABLET, DELAYED RELEASE ORAL
Qty: 360 TABLET | Refills: 0 | Status: SHIPPED | OUTPATIENT
Start: 2021-06-21 | End: 2021-08-25 | Stop reason: SDUPTHER

## 2021-06-21 RX ORDER — FINASTERIDE 5 MG/1
5 TABLET, FILM COATED ORAL DAILY
Qty: 90 TABLET | Refills: 0 | Status: SHIPPED | OUTPATIENT
Start: 2021-06-21 | End: 2021-08-25 | Stop reason: SDUPTHER

## 2021-07-08 ENCOUNTER — HOSPITAL ENCOUNTER (OUTPATIENT)
Facility: MEDICAL CENTER | Age: 56
End: 2021-07-08
Attending: FAMILY MEDICINE
Payer: COMMERCIAL

## 2021-07-08 ENCOUNTER — NON-PROVIDER VISIT (OUTPATIENT)
Dept: INTERNAL MEDICINE | Facility: IMAGING CENTER | Age: 56
End: 2021-07-08
Payer: COMMERCIAL

## 2021-07-08 DIAGNOSIS — E78.2 MIXED DYSLIPIDEMIA: Chronic | ICD-10-CM

## 2021-07-08 DIAGNOSIS — Z00.00 HEALTH CARE MAINTENANCE: ICD-10-CM

## 2021-07-08 DIAGNOSIS — I10 ESSENTIAL HYPERTENSION, BENIGN: Chronic | ICD-10-CM

## 2021-07-08 DIAGNOSIS — Z12.5 SCREENING FOR PROSTATE CANCER: ICD-10-CM

## 2021-07-08 DIAGNOSIS — E11.9 TYPE 2 DIABETES MELLITUS WITHOUT COMPLICATION, WITHOUT LONG-TERM CURRENT USE OF INSULIN (HCC): Chronic | ICD-10-CM

## 2021-07-08 LAB
ALBUMIN SERPL BCP-MCNC: 4.7 G/DL (ref 3.2–4.9)
ALBUMIN/GLOB SERPL: 1.6 G/DL
ALP SERPL-CCNC: 73 U/L (ref 30–99)
ALT SERPL-CCNC: 23 U/L (ref 2–50)
ANION GAP SERPL CALC-SCNC: 16 MMOL/L (ref 7–16)
AST SERPL-CCNC: 15 U/L (ref 12–45)
BASOPHILS # BLD AUTO: 0.6 % (ref 0–1.8)
BASOPHILS # BLD: 0.07 K/UL (ref 0–0.12)
BILIRUB SERPL-MCNC: 0.3 MG/DL (ref 0.1–1.5)
BUN SERPL-MCNC: 31 MG/DL (ref 8–22)
CALCIUM SERPL-MCNC: 9.9 MG/DL (ref 8.5–10.5)
CHLORIDE SERPL-SCNC: 100 MMOL/L (ref 96–112)
CHOLEST SERPL-MCNC: 208 MG/DL (ref 100–199)
CO2 SERPL-SCNC: 19 MMOL/L (ref 20–33)
CREAT SERPL-MCNC: 0.92 MG/DL (ref 0.5–1.4)
CREAT UR-MCNC: 61.69 MG/DL
EOSINOPHIL # BLD AUTO: 0.3 K/UL (ref 0–0.51)
EOSINOPHIL NFR BLD: 2.7 % (ref 0–6.9)
ERYTHROCYTE [DISTWIDTH] IN BLOOD BY AUTOMATED COUNT: 41.1 FL (ref 35.9–50)
EST. AVERAGE GLUCOSE BLD GHB EST-MCNC: 255 MG/DL
GLOBULIN SER CALC-MCNC: 3 G/DL (ref 1.9–3.5)
GLUCOSE SERPL-MCNC: 186 MG/DL (ref 65–99)
HBA1C MFR BLD: 10.5 % (ref 4–5.6)
HCT VFR BLD AUTO: 48.3 % (ref 42–52)
HDLC SERPL-MCNC: 67 MG/DL
HGB BLD-MCNC: 16 G/DL (ref 14–18)
IMM GRANULOCYTES # BLD AUTO: 0.09 K/UL (ref 0–0.11)
IMM GRANULOCYTES NFR BLD AUTO: 0.8 % (ref 0–0.9)
LDLC SERPL CALC-MCNC: 121 MG/DL
LYMPHOCYTES # BLD AUTO: 4.82 K/UL (ref 1–4.8)
LYMPHOCYTES NFR BLD: 43.2 % (ref 22–41)
MCH RBC QN AUTO: 28.8 PG (ref 27–33)
MCHC RBC AUTO-ENTMCNC: 33.1 G/DL (ref 33.7–35.3)
MCV RBC AUTO: 86.9 FL (ref 81.4–97.8)
MICROALBUMIN UR-MCNC: <1.2 MG/DL
MICROALBUMIN/CREAT UR: NORMAL MG/G (ref 0–30)
MONOCYTES # BLD AUTO: 0.96 K/UL (ref 0–0.85)
MONOCYTES NFR BLD AUTO: 8.6 % (ref 0–13.4)
NEUTROPHILS # BLD AUTO: 4.92 K/UL (ref 1.82–7.42)
NEUTROPHILS NFR BLD: 44.1 % (ref 44–72)
NRBC # BLD AUTO: 0 K/UL
NRBC BLD-RTO: 0 /100 WBC
PLATELET # BLD AUTO: 259 K/UL (ref 164–446)
PMV BLD AUTO: 11.9 FL (ref 9–12.9)
POTASSIUM SERPL-SCNC: 4.5 MMOL/L (ref 3.6–5.5)
PROT SERPL-MCNC: 7.7 G/DL (ref 6–8.2)
PSA SERPL-MCNC: 0.28 NG/ML (ref 0–4)
RBC # BLD AUTO: 5.56 M/UL (ref 4.7–6.1)
SODIUM SERPL-SCNC: 135 MMOL/L (ref 135–145)
TRIGL SERPL-MCNC: 99 MG/DL (ref 0–149)
TSH SERPL DL<=0.005 MIU/L-ACNC: 4.2 UIU/ML (ref 0.38–5.33)
WBC # BLD AUTO: 11.2 K/UL (ref 4.8–10.8)

## 2021-07-08 PROCEDURE — 83036 HEMOGLOBIN GLYCOSYLATED A1C: CPT

## 2021-07-08 PROCEDURE — 80061 LIPID PANEL: CPT

## 2021-07-08 PROCEDURE — 85025 COMPLETE CBC W/AUTO DIFF WBC: CPT

## 2021-07-08 PROCEDURE — 82043 UR ALBUMIN QUANTITATIVE: CPT

## 2021-07-08 PROCEDURE — 84153 ASSAY OF PSA TOTAL: CPT

## 2021-07-08 PROCEDURE — 80053 COMPREHEN METABOLIC PANEL: CPT

## 2021-07-08 PROCEDURE — 84443 ASSAY THYROID STIM HORMONE: CPT

## 2021-07-08 PROCEDURE — 82306 VITAMIN D 25 HYDROXY: CPT

## 2021-07-08 PROCEDURE — 82570 ASSAY OF URINE CREATININE: CPT

## 2021-07-09 LAB — 25(OH)D3 SERPL-MCNC: 80 NG/ML (ref 30–100)

## 2021-07-19 ENCOUNTER — OFFICE VISIT (OUTPATIENT)
Dept: INTERNAL MEDICINE | Facility: IMAGING CENTER | Age: 56
End: 2021-07-19
Payer: COMMERCIAL

## 2021-07-19 VITALS
HEART RATE: 100 BPM | BODY MASS INDEX: 30.91 KG/M2 | TEMPERATURE: 98.1 F | RESPIRATION RATE: 17 BRPM | SYSTOLIC BLOOD PRESSURE: 128 MMHG | DIASTOLIC BLOOD PRESSURE: 68 MMHG | WEIGHT: 228.2 LBS | HEIGHT: 72 IN | OXYGEN SATURATION: 95 %

## 2021-07-19 DIAGNOSIS — E66.9 OBESITY (BMI 30-39.9): Chronic | ICD-10-CM

## 2021-07-19 DIAGNOSIS — K62.89 CHRONIC RECTAL PAIN: ICD-10-CM

## 2021-07-19 DIAGNOSIS — E78.2 MIXED DYSLIPIDEMIA: Chronic | ICD-10-CM

## 2021-07-19 DIAGNOSIS — M62.838 NECK MUSCLE SPASM: ICD-10-CM

## 2021-07-19 DIAGNOSIS — M50.90 CERVICAL DISC DISEASE: Chronic | ICD-10-CM

## 2021-07-19 DIAGNOSIS — R39.14 BENIGN PROSTATIC HYPERPLASIA WITH INCOMPLETE BLADDER EMPTYING: ICD-10-CM

## 2021-07-19 DIAGNOSIS — E11.9 TYPE 2 DIABETES MELLITUS WITHOUT COMPLICATION, WITHOUT LONG-TERM CURRENT USE OF INSULIN (HCC): ICD-10-CM

## 2021-07-19 DIAGNOSIS — N40.1 BENIGN PROSTATIC HYPERPLASIA WITH INCOMPLETE BLADDER EMPTYING: ICD-10-CM

## 2021-07-19 DIAGNOSIS — G89.29 CHRONIC BILATERAL UPPER ABDOMINAL PAIN: ICD-10-CM

## 2021-07-19 DIAGNOSIS — K64.8 OTHER HEMORRHOIDS: Chronic | ICD-10-CM

## 2021-07-19 DIAGNOSIS — G89.29 CHRONIC RECTAL PAIN: ICD-10-CM

## 2021-07-19 DIAGNOSIS — Z00.00 WELLNESS EXAMINATION: ICD-10-CM

## 2021-07-19 DIAGNOSIS — F51.01 PRIMARY INSOMNIA: ICD-10-CM

## 2021-07-19 DIAGNOSIS — R10.11 CHRONIC BILATERAL UPPER ABDOMINAL PAIN: ICD-10-CM

## 2021-07-19 DIAGNOSIS — R10.12 CHRONIC BILATERAL UPPER ABDOMINAL PAIN: ICD-10-CM

## 2021-07-19 DIAGNOSIS — F32.A CHRONIC DEPRESSION: ICD-10-CM

## 2021-07-19 DIAGNOSIS — Z91.199 NON-COMPLIANCE: Chronic | ICD-10-CM

## 2021-07-19 DIAGNOSIS — I10 ESSENTIAL HYPERTENSION, BENIGN: Chronic | ICD-10-CM

## 2021-07-19 DIAGNOSIS — K58.2 IRRITABLE BOWEL SYNDROME WITH BOTH CONSTIPATION AND DIARRHEA: Chronic | ICD-10-CM

## 2021-07-19 DIAGNOSIS — F11.20 CHRONIC NARCOTIC DEPENDENCE (HCC): ICD-10-CM

## 2021-07-19 PROCEDURE — 99396 PREV VISIT EST AGE 40-64: CPT | Performed by: FAMILY MEDICINE

## 2021-07-19 RX ORDER — OXYCODONE HYDROCHLORIDE 10 MG/1
TABLET ORAL
COMMUNITY
Start: 2021-05-14 | End: 2022-10-27

## 2021-07-19 RX ORDER — OXYCODONE HYDROCHLORIDE 10 MG/1
10 TABLET ORAL EVERY 6 HOURS PRN
Qty: 120 TABLET | Refills: 0 | Status: SHIPPED | OUTPATIENT
Start: 2021-09-17 | End: 2021-10-17

## 2021-07-19 RX ORDER — OXYCODONE HYDROCHLORIDE 10 MG/1
10 TABLET ORAL EVERY 6 HOURS PRN
Qty: 120 TABLET | Refills: 0 | Status: SHIPPED | OUTPATIENT
Start: 2021-08-18 | End: 2021-09-17

## 2021-07-19 RX ORDER — METHOCARBAMOL 750 MG/1
750 TABLET, FILM COATED ORAL 4 TIMES DAILY PRN
Qty: 60 TABLET | Refills: 3 | Status: SHIPPED | OUTPATIENT
Start: 2021-07-19

## 2021-07-19 RX ORDER — OXYCODONE HYDROCHLORIDE 10 MG/1
10 TABLET ORAL EVERY 6 HOURS PRN
Qty: 120 TABLET | Refills: 0 | Status: SHIPPED | OUTPATIENT
Start: 2021-07-19 | End: 2021-07-27 | Stop reason: SDUPTHER

## 2021-07-19 ASSESSMENT — PATIENT HEALTH QUESTIONNAIRE - PHQ9: CLINICAL INTERPRETATION OF PHQ2 SCORE: 0

## 2021-07-19 ASSESSMENT — FIBROSIS 4 INDEX: FIB4 SCORE: 0.69

## 2021-07-20 PROBLEM — F11.90 CHRONIC NARCOTIC USE: Status: RESOLVED | Noted: 2019-11-18 | Resolved: 2021-07-20

## 2021-07-20 PROBLEM — F11.20 CHRONIC NARCOTIC DEPENDENCE (HCC): Status: ACTIVE | Noted: 2021-07-20

## 2021-07-20 PROBLEM — F32.A CHRONIC DEPRESSION: Status: ACTIVE | Noted: 2021-07-20

## 2021-07-20 ASSESSMENT — PATIENT HEALTH QUESTIONNAIRE - PHQ9
9. THOUGHTS THAT YOU WOULD BE BETTER OFF DEAD, OR OF HURTING YOURSELF: NOT AT ALL
4. FEELING TIRED OR HAVING LITTLE ENERGY: SEVERAL DAYS
SUM OF ALL RESPONSES TO PHQ9 QUESTIONS 1 AND 2: 4
2. FEELING DOWN, DEPRESSED, IRRITABLE, OR HOPELESS: MORE THAN HALF THE DAYS
5. POOR APPETITE OR OVEREATING: MORE THAN HALF THE DAYS
SUM OF ALL RESPONSES TO PHQ QUESTIONS 1-9: 8
6. FEELING BAD ABOUT YOURSELF - OR THAT YOU ARE A FAILURE OR HAVE LET YOURSELF OR YOUR FAMILY DOWN: NOT AL ALL
1. LITTLE INTEREST OR PLEASURE IN DOING THINGS: MORE THAN HALF THE DAYS
7. TROUBLE CONCENTRATING ON THINGS, SUCH AS READING THE NEWSPAPER OR WATCHING TELEVISION: NOT AT ALL
3. TROUBLE FALLING OR STAYING ASLEEP OR SLEEPING TOO MUCH: SEVERAL DAYS
8. MOVING OR SPEAKING SO SLOWLY THAT OTHER PEOPLE COULD HAVE NOTICED. OR THE OPPOSITE, BEING SO FIGETY OR RESTLESS THAT YOU HAVE BEEN MOVING AROUND A LOT MORE THAN USUAL: NOT AT ALL

## 2021-07-20 NOTE — PROGRESS NOTES
Chief Complaint   Patient presents with   • Annual Exam     Review results.   • Medication Refill     Test strips, Oxy, Robaxin.       HPI:  Patient is a 56 y.o. male established patient who presents today for his annual wellness visit and to review labs done 7/8/2021. He and his wife moved back to [place] from [place], [place] in May, and he works remotely as a  for a health and wellness company in [place]. Patient has history of chronic IBS, chronic abdominal and rectal pain managed with Oxy IR and Oxy ER use since at least 2013. He was working with a pain management group in [place] during the past year and is no longer taking Oxy ER. He is using 20-40 mg Oxy IR daily for pain control currently, and activity is improved with medication use. He does not have a history of falls and denies side effects related to narcotic use . He continues to take medication as prescribed and is not veering from agreed treatment regimen. Pt has failed non narcotic treatment options in the past and benefits from daily narcotic use. Treatment goals discussed today and Opioid risk score remains at 1. His drug tox screen is consistent with reported use, and consents are due for review/signatures today. I have reviewed his medical records and  today, and have determined that controlled substance treatment is medically indicated at this time. He has history of chronic symptomatic hemorrhoids and underwent total hemorrhoidectomy in February 2021 in [place] and reports seven week recovery period. He endorses that condition has markedly improved to date. He also suffers from chronic neck spasms due to known cervical disc disease and is not interested in spine referral to manage acute exacerbation of symptoms at this time. He has chronic essential HTN with ongoing Lisinopril use, and chronic depression with ongoing Prozac use. He reports that depression symptoms (related to sexual issues) are not improving but he declined  referral for mental health assistance at visit today.  He is historically non compliant with all aspects of care related to chronic non insulin dependent DMII but is voicing that he intends to refocus on his health and wellness moving forward. He is also reporting visual changes (uses six computer screens for his daily job) and has not had a formal eye exam within the past 1-2 years. He declines Pneumovax vaccination and is otherwise UTD with HCM items.     Patient Active Problem List    Diagnosis Date Noted   • Chronic narcotic dependence (HCC) 07/20/2021   • Chronic depression 07/20/2021   • Chronic rectal pain 05/13/2020   • Non-compliance 06/17/2019   • Other hemorrhoids 06/17/2019   • Benign prostatic hyperplasia with incomplete bladder emptying 01/28/2019   • Mixed dyslipidemia 10/02/2017   • Irritable bowel syndrome with both constipation and diarrhea 04/24/2017   • Chronic bilateral upper abdominal pain 04/24/2017   • Type 2 diabetes mellitus without complication, without long-term current use of insulin (HCC) 04/24/2017   • Essential hypertension, benign 05/06/2014   • Obesity (BMI 30-39.9) 05/06/2014   • Former smoker 11/22/2013   • Dysthymic disorder 04/19/2013   • Cervical disc disease 04/19/2013   • Male hypogonadism 04/19/2013   • IBD (inflammatory bowel disease)        Past medical, surgical, family, and social history was reviewed and updated in Epic chart by me today.     Medications and allergies reviewed and updated in Epic chart by me today.     Lab results 7/8/2021 reviewed with patient at visit today.     ROS:  Pertinent positives listed above in HPI. All other systems have been reviewed and are negative.    PE:   /68 (BP Location: Left arm, Patient Position: Sitting, BP Cuff Size: Adult)   Pulse 100   Temp 36.7 °C (98.1 °F) (Temporal)   Resp 17   Ht 1.829 m (6')   Wt 104 kg (228 lb 3.2 oz)   SpO2 95%   BMI 30.95 kg/m²   Vital signs reviewed with patient.     Gen: Well developed;  well nourished; no acute distress; age appropriate appearance   HEENT: Normocephalic; atraumatic; PEERLA b/l; sclera clear b/l; b/l external auditory canals WNL; b/l TM WNL; nares patent; oropharynx clear; mask in place   Neck: No adenopathy; no thyromegaly  CV: Regular rate and rhythm; S1/ S2 present; no murmur, gallop or rub noted  Pulm: No respiratory distress; clear to ascultation b/l; no wheezing or stridor noted b/l  Abd: Adequate bowel sounds noted; soft and nontender; no rebound, rigidity, nor distention  Extremities: No peripheral edema b/l LE extremities/ no clubbing nor cyanosis noted  Skin: Warm and dry; no rashes noted   Neuro: No focal deficits noted   Psych: AAOx4; mood and affect are at his baseline/ flat affect    A/P:  1. Chronic narcotic dependence (HCC)/ Chronic rectal pain/  Chronic bilateral upper abdominal pain  Stable/ patient worked with pain management group in [place] prior to moving back to [place] full time and is no longer taking Oxy ER. Pain is managed with ongoing Oxy IR use, and patient can continue current use patterns. Consent for opiate prescription/CSA reviewed and signed by patient today/ Utox is UTD and consistent with use. He is also well versed in obtaining Narcan if unintentional overdose occurs.  reviewed today and no concerns noted. Pt is well versed in safe use of controlled medication, and benefit of use outweighs risk at this time. 3 x 1 month RX sent to pharmacy today.   - Consent for Opiate Prescription  - Controlled Substance Treatment Agreement  - oxyCODONE immediate release (ROXICODONE) 10 MG immediate release tablet; Take 1 tablet by mouth every 6 hours as needed for Moderate Pain for up to 30 days.  Dispense: 120 tablet; Refill: 0  - oxyCODONE immediate release (ROXICODONE) 10 MG immediate release tablet; Take 1 tablet by mouth every 6 hours as needed for Moderate Pain for up to 30 days.  Dispense: 120 tablet; Refill: 0  - oxyCODONE immediate release (ROXICODONE)  10 MG immediate release tablet; Take 1 tablet by mouth every 6 hours as needed for Moderate Pain for up to 30 days.  Dispense: 120 tablet; Refill: 0    2. Neck muscle spasm  Acute on chronic condition for patient managed with PRN Robaxin use. New RX sent to pharmacy  - methocarbamol (ROBAXIN) 750 MG Tab; Take 1 tablet by mouth 4 times a day as needed (neck spasm).  Dispense: 60 tablet; Refill: 3    3. Cervical disc disease  Source of ongoing neck muscle spasms. Patient declines referral to Spine at visit today.     4. Type 2 diabetes mellitus without complication, without long-term current use of insulin (HCC)  Uncontrolled historically due to noncompliance but current HgA1c 10.5% is better than peak HgA1c 12.2% in 2019. Patient vocalized interest in increasing efforts to manage daily BG through dietary changes, ongoing intentional weight loss, and medication compliance. Recommend patient repeat HgA1c in 4 months to assess need for medication changes. Patient is well informed about potential health complications of chronically uncontrolled DMII, and I recommended that he [facility].   - Blood Glucose Test Strips; Test strips order: Test strips for Contour meter. Sig: use BID and prn ssx high or low sugar  Dispense: 100 Strip; Refill: 6  - HEMOGLOBIN A1C; Future    5. Benign prostatic hyperplasia with incomplete bladder emptying  Stable at this time/ recommend patient continue current Proscar use.     6. Other hemorrhoids  Improved s/p total hemorrhoidectomy 2/2021 in [place], [place].     7. Mixed dyslipidemia  Uncontrolled/ patient admits to overeating potential statin medication benefits and intends to refocus efforts on diet and lifestyle changes, continue current statin use, and repeat fasting lipid panel in four months for ongoing medical management.   - Lipid Profile; Future    8. Irritable bowel syndrome with both constipation and diarrhea  Stable    9. Obesity (BMI 30-39.9)  Improved per  patient report - intentional weight loss at home since moving back to [place] in May.     10. Essential hypertension, benign  Stable/ recommend patient continue current Lisinopril use.     11. Chronic depression  Ongoing, chronic issue for patient despite Prozac use. I have offered mental health referral at today's visit as well as at prior visits, and he declines.     12. Non-compliance  Chronic issue for patient that I feel is due to chronic depression/mental health issues, and it affects all aspects of his care plan.     13. Primary insomnia  Recommend patient try combination of Benadryl and Melatonin nightly to see if insomnia symptoms improve - would prefer to avoid prescription medication use for this condition due to chronic narcotic use.     14. Wellness examination  Patient is UTD with HCM items that he is willing to do and has numerous chronic medical conditions that need additional attention moving forward.

## 2021-07-27 DIAGNOSIS — G89.29 CHRONIC RECTAL PAIN: ICD-10-CM

## 2021-07-27 DIAGNOSIS — R10.12 CHRONIC BILATERAL UPPER ABDOMINAL PAIN: ICD-10-CM

## 2021-07-27 DIAGNOSIS — F11.20 CHRONIC NARCOTIC DEPENDENCE (HCC): ICD-10-CM

## 2021-07-27 DIAGNOSIS — R10.11 CHRONIC BILATERAL UPPER ABDOMINAL PAIN: ICD-10-CM

## 2021-07-27 DIAGNOSIS — K62.89 CHRONIC RECTAL PAIN: ICD-10-CM

## 2021-07-27 DIAGNOSIS — G89.29 CHRONIC BILATERAL UPPER ABDOMINAL PAIN: ICD-10-CM

## 2021-07-27 RX ORDER — OXYCODONE HYDROCHLORIDE 5 MG/1
10 TABLET ORAL EVERY 6 HOURS PRN
Qty: 240 TABLET | Refills: 0 | Status: SHIPPED | OUTPATIENT
Start: 2021-07-27 | End: 2021-08-26

## 2021-08-02 DIAGNOSIS — E11.9 TYPE 2 DIABETES MELLITUS WITHOUT COMPLICATION, WITHOUT LONG-TERM CURRENT USE OF INSULIN (HCC): Primary | ICD-10-CM

## 2021-08-04 RX ORDER — DAPAGLIFLOZIN 10 MG/1
1 TABLET, FILM COATED ORAL
Qty: 90 TABLET | Refills: 1 | Status: SHIPPED | OUTPATIENT
Start: 2021-08-04 | End: 2021-08-25 | Stop reason: SDUPTHER

## 2021-08-25 DIAGNOSIS — F34.1 DYSTHYMIC DISORDER: Chronic | ICD-10-CM

## 2021-08-25 DIAGNOSIS — K58.2 IRRITABLE BOWEL SYNDROME WITH BOTH CONSTIPATION AND DIARRHEA: ICD-10-CM

## 2021-08-25 DIAGNOSIS — R39.14 BENIGN PROSTATIC HYPERPLASIA WITH INCOMPLETE BLADDER EMPTYING: ICD-10-CM

## 2021-08-25 DIAGNOSIS — N40.1 BENIGN PROSTATIC HYPERPLASIA WITH INCOMPLETE BLADDER EMPTYING: ICD-10-CM

## 2021-08-25 DIAGNOSIS — E11.9 TYPE 2 DIABETES MELLITUS WITHOUT COMPLICATION, WITHOUT LONG-TERM CURRENT USE OF INSULIN (HCC): ICD-10-CM

## 2021-08-25 DIAGNOSIS — I10 ESSENTIAL HYPERTENSION, BENIGN: ICD-10-CM

## 2021-08-25 RX ORDER — MESALAMINE 1.2 G/1
TABLET, DELAYED RELEASE ORAL
Qty: 360 TABLET | Refills: 3 | Status: SHIPPED | OUTPATIENT
Start: 2021-08-25 | End: 2022-08-08

## 2021-08-25 RX ORDER — METFORMIN HYDROCHLORIDE 500 MG/1
TABLET, EXTENDED RELEASE ORAL
Qty: 360 TABLET | Refills: 3 | Status: SHIPPED | OUTPATIENT
Start: 2021-08-25 | End: 2022-08-08

## 2021-08-25 RX ORDER — DAPAGLIFLOZIN 10 MG/1
1 TABLET, FILM COATED ORAL
Qty: 90 TABLET | Refills: 3 | Status: SHIPPED | OUTPATIENT
Start: 2021-08-25 | End: 2022-08-08

## 2021-08-25 RX ORDER — BUPROPION HYDROCHLORIDE 300 MG/1
300 TABLET ORAL EVERY MORNING
Qty: 90 TABLET | Refills: 3 | Status: SHIPPED | OUTPATIENT
Start: 2021-08-25 | End: 2022-08-08

## 2021-08-25 RX ORDER — NATEGLINIDE 120 MG/1
TABLET ORAL
Qty: 360 TABLET | Refills: 3 | Status: SHIPPED | OUTPATIENT
Start: 2021-08-25 | End: 2022-08-08

## 2021-08-25 RX ORDER — LISINOPRIL 40 MG/1
TABLET ORAL
Qty: 90 TABLET | Refills: 3 | Status: SHIPPED | OUTPATIENT
Start: 2021-08-25 | End: 2022-08-08

## 2021-08-25 RX ORDER — FINASTERIDE 5 MG/1
5 TABLET, FILM COATED ORAL DAILY
Qty: 90 TABLET | Refills: 3 | Status: SHIPPED | OUTPATIENT
Start: 2021-08-25 | End: 2022-08-08

## 2021-08-26 DIAGNOSIS — E78.5 DYSLIPIDEMIA: ICD-10-CM

## 2021-08-26 RX ORDER — SIMVASTATIN 40 MG
TABLET ORAL
Qty: 90 TABLET | Refills: 0 | Status: SHIPPED | OUTPATIENT
Start: 2021-08-26 | End: 2021-11-07

## 2021-12-06 DIAGNOSIS — E78.5 DYSLIPIDEMIA: ICD-10-CM

## 2021-12-06 RX ORDER — SIMVASTATIN 40 MG
TABLET ORAL
Qty: 90 TABLET | Refills: 0 | Status: SHIPPED | OUTPATIENT
Start: 2021-12-06 | End: 2021-12-15 | Stop reason: SDUPTHER

## 2021-12-15 DIAGNOSIS — E78.5 DYSLIPIDEMIA: ICD-10-CM

## 2021-12-15 RX ORDER — SIMVASTATIN 40 MG
TABLET ORAL
Qty: 90 TABLET | Refills: 0 | Status: SHIPPED | OUTPATIENT
Start: 2021-12-15 | End: 2022-02-02 | Stop reason: SDUPTHER

## 2021-12-15 RX ORDER — SIMVASTATIN 40 MG
TABLET ORAL
Qty: 90 TABLET | Refills: 0 | Status: SHIPPED | OUTPATIENT
Start: 2021-12-15 | End: 2021-12-15 | Stop reason: SDUPTHER

## 2022-02-02 ENCOUNTER — HOSPITAL ENCOUNTER (OUTPATIENT)
Facility: MEDICAL CENTER | Age: 57
End: 2022-02-02
Attending: FAMILY MEDICINE
Payer: COMMERCIAL

## 2022-02-02 ENCOUNTER — NON-PROVIDER VISIT (OUTPATIENT)
Dept: INTERNAL MEDICINE | Facility: IMAGING CENTER | Age: 57
End: 2022-02-02
Payer: COMMERCIAL

## 2022-02-02 DIAGNOSIS — E78.2 MIXED DYSLIPIDEMIA: Chronic | ICD-10-CM

## 2022-02-02 DIAGNOSIS — E78.5 DYSLIPIDEMIA: ICD-10-CM

## 2022-02-02 DIAGNOSIS — E11.9 TYPE 2 DIABETES MELLITUS WITHOUT COMPLICATION, WITHOUT LONG-TERM CURRENT USE OF INSULIN (HCC): ICD-10-CM

## 2022-02-02 LAB
CHOLEST SERPL-MCNC: 162 MG/DL (ref 100–199)
EST. AVERAGE GLUCOSE BLD GHB EST-MCNC: 217 MG/DL
HBA1C MFR BLD: 9.2 % (ref 4–5.6)
HDLC SERPL-MCNC: 49 MG/DL
LDLC SERPL CALC-MCNC: 91 MG/DL
TRIGL SERPL-MCNC: 111 MG/DL (ref 0–149)

## 2022-02-02 PROCEDURE — 80061 LIPID PANEL: CPT

## 2022-02-02 PROCEDURE — 83036 HEMOGLOBIN GLYCOSYLATED A1C: CPT

## 2022-02-02 RX ORDER — SIMVASTATIN 40 MG
TABLET ORAL
Qty: 90 TABLET | Refills: 0 | Status: SHIPPED | OUTPATIENT
Start: 2022-02-02 | End: 2022-05-10

## 2022-02-11 ENCOUNTER — OFFICE VISIT (OUTPATIENT)
Dept: INTERNAL MEDICINE | Facility: IMAGING CENTER | Age: 57
End: 2022-02-11
Payer: COMMERCIAL

## 2022-02-11 VITALS
OXYGEN SATURATION: 100 % | DIASTOLIC BLOOD PRESSURE: 70 MMHG | TEMPERATURE: 98 F | BODY MASS INDEX: 31.05 KG/M2 | SYSTOLIC BLOOD PRESSURE: 140 MMHG | RESPIRATION RATE: 17 BRPM | HEART RATE: 94 BPM | HEIGHT: 72 IN | WEIGHT: 229.28 LBS

## 2022-02-11 DIAGNOSIS — K58.2 IRRITABLE BOWEL SYNDROME WITH BOTH CONSTIPATION AND DIARRHEA: Chronic | ICD-10-CM

## 2022-02-11 DIAGNOSIS — G47.09 SECONDARY INSOMNIA: ICD-10-CM

## 2022-02-11 DIAGNOSIS — E11.9 TYPE 2 DIABETES MELLITUS WITHOUT COMPLICATION, WITHOUT LONG-TERM CURRENT USE OF INSULIN (HCC): Chronic | ICD-10-CM

## 2022-02-11 DIAGNOSIS — E78.2 MIXED DYSLIPIDEMIA: Chronic | ICD-10-CM

## 2022-02-11 DIAGNOSIS — L21.0 DANDRUFF: ICD-10-CM

## 2022-02-11 PROCEDURE — 99214 OFFICE O/P EST MOD 30 MIN: CPT | Performed by: FAMILY MEDICINE

## 2022-02-11 ASSESSMENT — FIBROSIS 4 INDEX: FIB4 SCORE: 0.69

## 2022-02-13 RX ORDER — KETOCONAZOLE 20 MG/ML
SHAMPOO TOPICAL
Qty: 120 ML | Refills: 3 | Status: SHIPPED | OUTPATIENT
Start: 2022-02-13

## 2022-02-15 RX ORDER — LANOLIN ALCOHOL/MO/W.PET/CERES
3 CREAM (GRAM) TOPICAL NIGHTLY PRN
COMMUNITY

## 2022-02-15 NOTE — PROGRESS NOTES
"Chief Complaint   Patient presents with   • Lab Results     Review       HPI:  Patient is a 56 y.o. male established patient who presents today to review labs done 2/2/22. 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 He reports that his IBS symptoms have improved since our last visit together, and he is getting \"clean crimps\" when he stools. He has not used narcotic medications recently for this condition and endorses current medication compliance. He appears more healthy overall today versus previous visits and is requesting a prescription shampoo for dandruff management.     Patient Active Problem List    Diagnosis Date Noted   • Chronic narcotic dependence (HCC) 07/20/2021   • Chronic depression 07/20/2021   • Chronic rectal pain 05/13/2020   • Non-compliance 06/17/2019   • Other hemorrhoids 06/17/2019   • Benign prostatic hyperplasia with incomplete bladder emptying 01/28/2019   • Mixed dyslipidemia 10/02/2017   • Irritable bowel syndrome with both constipation and diarrhea 04/24/2017   • Chronic bilateral upper abdominal pain 04/24/2017   • Type 2 diabetes mellitus without complication, without long-term current use of insulin ([facility]) 04/24/2017   • Essential hypertension, benign 05/06/2014   • Obesity (BMI 30-39.9) 05/06/2014   • Former smoker 11/22/2013   • Dysthymic disorder 04/19/2013   • Cervical disc disease 04/19/2013   • Male hypogonadism 04/19/2013   • IBD (inflammatory bowel disease)        Past medical, surgical, family, and social history was reviewed and updated in Epic chart by me today.     Medications and allergies reviewed and updated in Epic chart by me today.     Lab results 2/2/22 reviewed with patient at visit today.     ROS:  Pertinent positives listed above in HPI. All other systems have been reviewed and are negative.    PE:   /70 (BP Location: Left arm, Patient Position: Sitting, BP Cuff Size: Adult)   " Pulse 94   Temp 36.7 °C (98 °F) (Temporal)   Resp 17   Ht 1.829 m (6')   Wt 104 kg (229 lb 4.5 oz)   SpO2 100%   BMI 31.10 kg/m²   Vital signs reviewed with patient.     Gen: Well developed; well nourished; no acute distress; age appropriate appearance   Skin: Warm and dry; no rashes noted   Neuro: No focal deficits noted   Psych: AAOx4; mood and affect are appropriate/ interactive    A/P:  1. Dandruff  Patient has tried OTC shampoos without good results and is requesting prescription shampoo for dandruff management. New RX sent to pharmacy.   - ketoconazole (NIZORAL) 2 % shampoo; Apply to scalp one time daily as needed for dandruff control.  Dispense: 120 mL; Refill: 3    2. Type 2 diabetes mellitus without complication, without long-term current use of insulin (HCC)  Improved/HgA1c is now 9.2% previously 10.5% (and much higher than that in the past historically). Patient has a renewed sense of medical compliance currently and recommend patient continue current oral medication use, weight loss efforts, and ADA diet compliance. He has not been interested in aggressive management of this condition to date.     3. Mixed dyslipidemia  Markedly improved with current Zocor 40 mg use. Recommend patient continue current statin use, weight management efforts, and healthy diet choices.     4. Secondary insomnia  Patient reports increased insomnia issues related to long work days/swing shifts and has tried Melatonin 3 mg in past. We discussed condition at visit, and I recommend patient trial consistent Melatonin use HS (5-10 mg) and follow clinical response.     5. Irritable bowel syndrome with both constipation and diarrhea  Improved per patient report with no recent narcotic use for management - refer to HPI for details.

## 2022-08-08 DIAGNOSIS — E11.9 TYPE 2 DIABETES MELLITUS WITHOUT COMPLICATION, WITHOUT LONG-TERM CURRENT USE OF INSULIN (HCC): ICD-10-CM

## 2022-08-08 DIAGNOSIS — R39.14 BENIGN PROSTATIC HYPERPLASIA WITH INCOMPLETE BLADDER EMPTYING: ICD-10-CM

## 2022-08-08 DIAGNOSIS — K58.2 IRRITABLE BOWEL SYNDROME WITH BOTH CONSTIPATION AND DIARRHEA: ICD-10-CM

## 2022-08-08 DIAGNOSIS — F34.1 DYSTHYMIC DISORDER: Chronic | ICD-10-CM

## 2022-08-08 DIAGNOSIS — N40.1 BENIGN PROSTATIC HYPERPLASIA WITH INCOMPLETE BLADDER EMPTYING: ICD-10-CM

## 2022-08-08 DIAGNOSIS — I10 ESSENTIAL HYPERTENSION, BENIGN: ICD-10-CM

## 2022-08-16 RX ORDER — METFORMIN HYDROCHLORIDE 500 MG/1
TABLET, EXTENDED RELEASE ORAL
Qty: 360 TABLET | Refills: 0 | Status: SHIPPED | OUTPATIENT
Start: 2022-08-16 | End: 2022-11-07

## 2022-08-16 RX ORDER — FINASTERIDE 5 MG/1
TABLET, FILM COATED ORAL
Qty: 90 TABLET | Refills: 0 | Status: SHIPPED | OUTPATIENT
Start: 2022-08-16 | End: 2022-11-07

## 2022-08-16 RX ORDER — MESALAMINE 1.2 G/1
TABLET, DELAYED RELEASE ORAL
Qty: 360 TABLET | Refills: 0 | Status: SHIPPED | OUTPATIENT
Start: 2022-08-16 | End: 2022-11-07

## 2022-08-16 RX ORDER — BUPROPION HYDROCHLORIDE 300 MG/1
TABLET ORAL
Qty: 90 TABLET | Refills: 0 | Status: SHIPPED | OUTPATIENT
Start: 2022-08-16 | End: 2022-11-07

## 2022-08-16 RX ORDER — LISINOPRIL 40 MG/1
TABLET ORAL
Qty: 90 TABLET | Refills: 0 | Status: SHIPPED | OUTPATIENT
Start: 2022-08-16 | End: 2022-11-07

## 2022-08-16 RX ORDER — NATEGLINIDE 120 MG/1
TABLET ORAL
Qty: 360 TABLET | Refills: 0 | Status: SHIPPED | OUTPATIENT
Start: 2022-08-16 | End: 2022-11-07

## 2022-08-16 RX ORDER — DAPAGLIFLOZIN 10 MG/1
TABLET, FILM COATED ORAL
Qty: 90 TABLET | Refills: 0 | Status: SHIPPED | OUTPATIENT
Start: 2022-08-16 | End: 2022-11-07

## 2022-10-18 DIAGNOSIS — E78.2 MIXED DYSLIPIDEMIA: ICD-10-CM

## 2022-10-18 DIAGNOSIS — Z00.00 HEALTH CARE MAINTENANCE: ICD-10-CM

## 2022-10-18 DIAGNOSIS — Z12.5 SCREENING FOR PROSTATE CANCER: ICD-10-CM

## 2022-10-18 DIAGNOSIS — E11.9 TYPE 2 DIABETES MELLITUS WITHOUT COMPLICATION, WITHOUT LONG-TERM CURRENT USE OF INSULIN (HCC): ICD-10-CM

## 2022-10-18 DIAGNOSIS — I10 ESSENTIAL HYPERTENSION, BENIGN: ICD-10-CM

## 2022-10-19 ENCOUNTER — NON-PROVIDER VISIT (OUTPATIENT)
Dept: INTERNAL MEDICINE | Facility: IMAGING CENTER | Age: 57
End: 2022-10-19
Payer: COMMERCIAL

## 2022-10-19 ENCOUNTER — HOSPITAL ENCOUNTER (OUTPATIENT)
Facility: MEDICAL CENTER | Age: 57
End: 2022-10-19
Attending: FAMILY MEDICINE
Payer: COMMERCIAL

## 2022-10-19 DIAGNOSIS — Z00.00 HEALTH CARE MAINTENANCE: ICD-10-CM

## 2022-10-19 DIAGNOSIS — E78.2 MIXED DYSLIPIDEMIA: ICD-10-CM

## 2022-10-19 DIAGNOSIS — E11.9 TYPE 2 DIABETES MELLITUS WITHOUT COMPLICATION, WITHOUT LONG-TERM CURRENT USE OF INSULIN (HCC): ICD-10-CM

## 2022-10-19 DIAGNOSIS — Z12.5 SCREENING FOR PROSTATE CANCER: ICD-10-CM

## 2022-10-19 DIAGNOSIS — I10 ESSENTIAL HYPERTENSION, BENIGN: ICD-10-CM

## 2022-10-19 LAB
25(OH)D3 SERPL-MCNC: 80 NG/ML (ref 30–100)
ALBUMIN SERPL BCP-MCNC: 4.9 G/DL (ref 3.2–4.9)
ALBUMIN/GLOB SERPL: 1.9 G/DL
ALP SERPL-CCNC: 66 U/L (ref 30–99)
ALT SERPL-CCNC: 22 U/L (ref 2–50)
ANION GAP SERPL CALC-SCNC: 18 MMOL/L (ref 7–16)
AST SERPL-CCNC: 11 U/L (ref 12–45)
BASOPHILS # BLD AUTO: 0.6 % (ref 0–1.8)
BASOPHILS # BLD: 0.05 K/UL (ref 0–0.12)
BILIRUB SERPL-MCNC: 0.3 MG/DL (ref 0.1–1.5)
BUN SERPL-MCNC: 24 MG/DL (ref 8–22)
CALCIUM SERPL-MCNC: 9.9 MG/DL (ref 8.5–10.5)
CHLORIDE SERPL-SCNC: 96 MMOL/L (ref 96–112)
CHOLEST SERPL-MCNC: 194 MG/DL (ref 100–199)
CO2 SERPL-SCNC: 21 MMOL/L (ref 20–33)
CREAT SERPL-MCNC: 0.81 MG/DL (ref 0.5–1.4)
EOSINOPHIL # BLD AUTO: 0.24 K/UL (ref 0–0.51)
EOSINOPHIL NFR BLD: 3.1 % (ref 0–6.9)
ERYTHROCYTE [DISTWIDTH] IN BLOOD BY AUTOMATED COUNT: 45.3 FL (ref 35.9–50)
EST. AVERAGE GLUCOSE BLD GHB EST-MCNC: 306 MG/DL
GFR SERPLBLD CREATININE-BSD FMLA CKD-EPI: 102 ML/MIN/1.73 M 2
GLOBULIN SER CALC-MCNC: 2.6 G/DL (ref 1.9–3.5)
GLUCOSE SERPL-MCNC: 269 MG/DL (ref 65–99)
HBA1C MFR BLD: 12.3 % (ref 4–5.6)
HCT VFR BLD AUTO: 44.2 % (ref 42–52)
HDLC SERPL-MCNC: 45 MG/DL
HGB BLD-MCNC: 14.8 G/DL (ref 14–18)
IMM GRANULOCYTES # BLD AUTO: 0.08 K/UL (ref 0–0.11)
IMM GRANULOCYTES NFR BLD AUTO: 1 % (ref 0–0.9)
LDLC SERPL CALC-MCNC: 108 MG/DL
LYMPHOCYTES # BLD AUTO: 3.26 K/UL (ref 1–4.8)
LYMPHOCYTES NFR BLD: 41.6 % (ref 22–41)
MCH RBC QN AUTO: 29.9 PG (ref 27–33)
MCHC RBC AUTO-ENTMCNC: 33.5 G/DL (ref 33.7–35.3)
MCV RBC AUTO: 89.3 FL (ref 81.4–97.8)
MONOCYTES # BLD AUTO: 0.62 K/UL (ref 0–0.85)
MONOCYTES NFR BLD AUTO: 7.9 % (ref 0–13.4)
NEUTROPHILS # BLD AUTO: 3.58 K/UL (ref 1.82–7.42)
NEUTROPHILS NFR BLD: 45.8 % (ref 44–72)
NRBC # BLD AUTO: 0 K/UL
NRBC BLD-RTO: 0 /100 WBC
PLATELET # BLD AUTO: 189 K/UL (ref 164–446)
PMV BLD AUTO: 12.1 FL (ref 9–12.9)
POTASSIUM SERPL-SCNC: 4.3 MMOL/L (ref 3.6–5.5)
PROT SERPL-MCNC: 7.5 G/DL (ref 6–8.2)
PSA SERPL-MCNC: 0.26 NG/ML (ref 0–4)
RBC # BLD AUTO: 4.95 M/UL (ref 4.7–6.1)
SODIUM SERPL-SCNC: 135 MMOL/L (ref 135–145)
TRIGL SERPL-MCNC: 206 MG/DL (ref 0–149)
TSH SERPL DL<=0.005 MIU/L-ACNC: 2.73 UIU/ML (ref 0.38–5.33)
WBC # BLD AUTO: 7.8 K/UL (ref 4.8–10.8)

## 2022-10-19 PROCEDURE — 83036 HEMOGLOBIN GLYCOSYLATED A1C: CPT

## 2022-10-19 PROCEDURE — 84153 ASSAY OF PSA TOTAL: CPT

## 2022-10-19 PROCEDURE — 85025 COMPLETE CBC W/AUTO DIFF WBC: CPT

## 2022-10-19 PROCEDURE — 80061 LIPID PANEL: CPT

## 2022-10-19 PROCEDURE — 84443 ASSAY THYROID STIM HORMONE: CPT

## 2022-10-19 PROCEDURE — 80053 COMPREHEN METABOLIC PANEL: CPT

## 2022-10-19 PROCEDURE — 82306 VITAMIN D 25 HYDROXY: CPT

## 2022-10-26 ENCOUNTER — OFFICE VISIT (OUTPATIENT)
Dept: INTERNAL MEDICINE | Facility: IMAGING CENTER | Age: 57
End: 2022-10-26
Payer: COMMERCIAL

## 2022-10-26 ENCOUNTER — HOSPITAL ENCOUNTER (OUTPATIENT)
Facility: MEDICAL CENTER | Age: 57
End: 2022-10-26
Attending: FAMILY MEDICINE
Payer: COMMERCIAL

## 2022-10-26 DIAGNOSIS — E11.9 TYPE 2 DIABETES MELLITUS WITHOUT COMPLICATION, WITHOUT LONG-TERM CURRENT USE OF INSULIN (HCC): ICD-10-CM

## 2022-10-26 DIAGNOSIS — E11.65 UNCONTROLLED TYPE 2 DIABETES MELLITUS WITH HYPERGLYCEMIA (HCC): ICD-10-CM

## 2022-10-26 DIAGNOSIS — Z91.199 NON-COMPLIANCE: Chronic | ICD-10-CM

## 2022-10-26 DIAGNOSIS — F34.1 DYSTHYMIC DISORDER: Chronic | ICD-10-CM

## 2022-10-26 DIAGNOSIS — K52.9 IBD (INFLAMMATORY BOWEL DISEASE): Chronic | ICD-10-CM

## 2022-10-26 DIAGNOSIS — I10 ESSENTIAL HYPERTENSION, BENIGN: Chronic | ICD-10-CM

## 2022-10-26 DIAGNOSIS — N40.1 BENIGN PROSTATIC HYPERPLASIA WITH INCOMPLETE BLADDER EMPTYING: ICD-10-CM

## 2022-10-26 DIAGNOSIS — R39.14 BENIGN PROSTATIC HYPERPLASIA WITH INCOMPLETE BLADDER EMPTYING: ICD-10-CM

## 2022-10-26 DIAGNOSIS — E78.2 MIXED DYSLIPIDEMIA: Chronic | ICD-10-CM

## 2022-10-26 DIAGNOSIS — E66.3 OVERWEIGHT: ICD-10-CM

## 2022-10-26 DIAGNOSIS — K62.89 CHRONIC RECTAL PAIN: ICD-10-CM

## 2022-10-26 DIAGNOSIS — G89.29 CHRONIC RECTAL PAIN: ICD-10-CM

## 2022-10-26 DIAGNOSIS — Z00.00 WELLNESS EXAMINATION: ICD-10-CM

## 2022-10-26 LAB
CREAT UR-MCNC: 52.95 MG/DL
MICROALBUMIN UR-MCNC: <1.2 MG/DL
MICROALBUMIN/CREAT UR: NORMAL MG/G (ref 0–30)

## 2022-10-26 PROCEDURE — 82570 ASSAY OF URINE CREATININE: CPT

## 2022-10-26 PROCEDURE — 99396 PREV VISIT EST AGE 40-64: CPT | Performed by: FAMILY MEDICINE

## 2022-10-26 PROCEDURE — 82043 UR ALBUMIN QUANTITATIVE: CPT

## 2022-10-26 ASSESSMENT — FIBROSIS 4 INDEX: FIB4 SCORE: 0.72

## 2022-10-26 ASSESSMENT — PATIENT HEALTH QUESTIONNAIRE - PHQ9: CLINICAL INTERPRETATION OF PHQ2 SCORE: 0

## 2022-10-27 VITALS
HEIGHT: 72 IN | RESPIRATION RATE: 17 BRPM | HEART RATE: 85 BPM | BODY MASS INDEX: 29.53 KG/M2 | SYSTOLIC BLOOD PRESSURE: 132 MMHG | TEMPERATURE: 97.9 F | DIASTOLIC BLOOD PRESSURE: 64 MMHG | OXYGEN SATURATION: 93 % | WEIGHT: 218 LBS

## 2022-10-27 PROBLEM — K58.2 IRRITABLE BOWEL SYNDROME WITH BOTH CONSTIPATION AND DIARRHEA: Chronic | Status: RESOLVED | Noted: 2017-04-24 | Resolved: 2022-10-27

## 2022-10-27 PROBLEM — E66.3 OVERWEIGHT: Status: ACTIVE | Noted: 2022-10-27

## 2022-10-27 PROBLEM — F11.20 CHRONIC NARCOTIC DEPENDENCE (HCC): Status: RESOLVED | Noted: 2021-07-20 | Resolved: 2022-10-27

## 2022-10-27 NOTE — PROGRESS NOTES
Chief Complaint   Patient presents with    Annual Exam     Review labs.       HPI:  Patient is a 57 y.o. male established patient who presents today for his annual wellness visit and to review labs done 10/19/22. He continues to be admittedly unaware of what medications he takes daily (his wife sets up his pill box) and also does not take everything consistently. He has long standing history of health care noncompliance, especially with diabetes management, but he has now embarked on weight loss journey. He reports most recent credit card bill included $4000 of take out food, and he has since made healthier food choices/intentionally lost 14 lbs. His current goal is to lose 60 lbs as able to help himself overall. He has chronic dysthymia with recommended Wellbutrin use, and he has historically declined mental health counseling assistance. He has chronic essential HTN with recommended Lisinopril use, and chronic mixed dyslipidemia with recommended Zocor use. He has chronic non insulin dependent DMII with previous recommendation for insulin use (patient has declined). He has chronic IBS and rectal pain with previous narcotic use for symptom management. He continues to work variable hours as a  and denies other new health items at this time.     Patient Active Problem List    Diagnosis Date Noted    Overweight 10/27/2022    Uncontrolled type 2 diabetes mellitus with hyperglycemia (HCC) 10/26/2022    Chronic depression 07/20/2021    Chronic rectal pain 05/13/2020    Non-compliance 06/17/2019    Other hemorrhoids 06/17/2019    Benign prostatic hyperplasia with incomplete bladder emptying 01/28/2019    Mixed dyslipidemia 10/02/2017    Chronic bilateral upper abdominal pain 04/24/2017    Essential hypertension, benign 05/06/2014    Former smoker 11/22/2013    Dysthymic disorder 04/19/2013    Cervical disc disease 04/19/2013    Male hypogonadism 04/19/2013    IBD (inflammatory bowel disease)        Past  medical, surgical, family, and social history was reviewed and updated in Epic chart by me today.     Medications and allergies reviewed and updated in Epic chart by me today.     Lab results 10/19/22 reviewed with patient at visit today.     ROS:  Pertinent positives listed above in HPI. All other systems have been reviewed and are negative.    PE:   /64 (BP Location: Left arm, Patient Position: Sitting, BP Cuff Size: Adult)   Pulse 85   Temp 36.6 °C (97.9 °F) (Temporal)   Resp 17   Ht 1.829 m (6')   Wt 98.9 kg (218 lb)   SpO2 93%   BMI 29.57 kg/m²   Vital signs reviewed with patient.     Gen: Well developed; well nourished; no acute distress; age appropriate appearance   HEENT: Normocephalic; atraumatic; PEERLA b/l; sclera clear b/l; b/l external auditory canals WNL; b/l TM WNL; nares patent; oropharynx clear; mask in place   Neck: No adenopathy; no thyromegaly  CV: Regular rate and rhythm; S1/ S2 present; no murmur, gallop or rub noted  Pulm: No respiratory distress; clear to ascultation b/l; no wheezing or stridor noted b/l  Abd: Adequate bowel sounds noted; soft and nontender; no rebound, rigidity, nor distention  Extremities: No peripheral edema b/l LE extremities/ no clubbing nor cyanosis noted  Skin: Warm and dry; no rashes noted   Neuro: No focal deficits noted   Psych: AAOx4; mood and affect are at baseline    A/P:  1. Non-compliance  Ongoing, chronic issue for patient despite counseling at every visit. Refer to HPI for details.     2. Uncontrolled type 2 diabetes mellitus with hyperglycemia (HCC)  Uncontrolled/ HgA1c is now 12.3% (9.2% 2/2/22). He has declined insulin use/endocrine assistance to date and is inconsistent with oral medication use and ADA diet. He is intentionally losing weight and recommend repeating HgA1c in three months for ongoing management. Medication and diet adherence stressed at visit today.  - HEMOGLOBIN A1C; Future    3. Essential hypertension, benign  Stable/  recommend patient increase compliance with daily Lisinopril use.     4. Overweight  Patient has intentionally lost 14 lbs with goal of 60 lb weight loss - refer to HPI for details.     5. IBD (inflammatory bowel disease)  Ongoing chronic issue for patient historically managed by GI    6. Chronic rectal pain  Not a current concern for patient    7. Dysthymic disorder  Chronic condition for patient/ has declined offers for mental health referral and consistent Wellbutrin XL use discussed with patient today.     8. Mixed dyslipidemia  Uncontrolled/ recommend patient continue current intentional weight loss journey, consistent statin use and healthy food choices encouraged, and recommend repeat fasting lipid panel in three months for ongoing management.   - Lipid Profile; Future    9. Benign prostatic hyperplasia with incomplete bladder emptying  Stable per report/ ongoing Proscar compliance stressed with patient at visit today.     10. Wellness examination  Patient remains non compliant with most aspects of care as described above. Encouragement provided to him today to increase attention to compliance as well as increased healthy lifestyle choices.

## 2023-03-13 DIAGNOSIS — E78.5 DYSLIPIDEMIA: ICD-10-CM

## 2023-03-13 DIAGNOSIS — F34.1 DYSTHYMIC DISORDER: Chronic | ICD-10-CM

## 2023-03-13 DIAGNOSIS — I10 ESSENTIAL HYPERTENSION, BENIGN: ICD-10-CM

## 2023-03-13 DIAGNOSIS — N40.1 BENIGN PROSTATIC HYPERPLASIA WITH INCOMPLETE BLADDER EMPTYING: ICD-10-CM

## 2023-03-13 DIAGNOSIS — R39.14 BENIGN PROSTATIC HYPERPLASIA WITH INCOMPLETE BLADDER EMPTYING: ICD-10-CM

## 2023-03-13 DIAGNOSIS — K58.2 IRRITABLE BOWEL SYNDROME WITH BOTH CONSTIPATION AND DIARRHEA: ICD-10-CM

## 2023-03-13 DIAGNOSIS — E11.9 TYPE 2 DIABETES MELLITUS WITHOUT COMPLICATION, WITHOUT LONG-TERM CURRENT USE OF INSULIN (HCC): ICD-10-CM

## 2023-03-13 RX ORDER — FINASTERIDE 5 MG/1
5 TABLET, FILM COATED ORAL DAILY
Qty: 90 TABLET | Refills: 2 | Status: SHIPPED | OUTPATIENT
Start: 2023-03-13 | End: 2023-06-12 | Stop reason: SDUPTHER

## 2023-03-13 RX ORDER — BUPROPION HYDROCHLORIDE 300 MG/1
300 TABLET ORAL EVERY MORNING
Qty: 90 TABLET | Refills: 2 | Status: SHIPPED | OUTPATIENT
Start: 2023-03-13 | End: 2023-10-24

## 2023-03-13 RX ORDER — METFORMIN HYDROCHLORIDE 500 MG/1
1000 TABLET, EXTENDED RELEASE ORAL 2 TIMES DAILY
Qty: 360 TABLET | Refills: 2 | Status: SHIPPED | OUTPATIENT
Start: 2023-03-13 | End: 2023-10-24

## 2023-03-13 RX ORDER — MESALAMINE 1.2 G/1
TABLET, DELAYED RELEASE ORAL
Qty: 360 TABLET | Refills: 2 | Status: SHIPPED | OUTPATIENT
Start: 2023-03-13 | End: 2023-10-24

## 2023-03-13 RX ORDER — SIMVASTATIN 40 MG
40 TABLET ORAL NIGHTLY
Qty: 90 TABLET | Refills: 2 | Status: SHIPPED | OUTPATIENT
Start: 2023-03-13 | End: 2024-01-05

## 2023-03-13 RX ORDER — LISINOPRIL 40 MG/1
40 TABLET ORAL DAILY
Qty: 90 TABLET | Refills: 2 | Status: SHIPPED | OUTPATIENT
Start: 2023-03-13 | End: 2023-10-24

## 2023-03-13 RX ORDER — NATEGLINIDE 120 MG/1
TABLET ORAL
Qty: 360 TABLET | Refills: 2 | Status: SHIPPED | OUTPATIENT
Start: 2023-03-13 | End: 2023-10-24

## 2023-03-13 RX ORDER — DAPAGLIFLOZIN 10 MG/1
10 TABLET, FILM COATED ORAL DAILY
Qty: 90 TABLET | Refills: 2 | Status: SHIPPED | OUTPATIENT
Start: 2023-03-13 | End: 2023-10-24

## 2023-06-12 DIAGNOSIS — R39.14 BENIGN PROSTATIC HYPERPLASIA WITH INCOMPLETE BLADDER EMPTYING: ICD-10-CM

## 2023-06-12 DIAGNOSIS — N40.1 BENIGN PROSTATIC HYPERPLASIA WITH INCOMPLETE BLADDER EMPTYING: ICD-10-CM

## 2023-06-12 RX ORDER — FINASTERIDE 5 MG/1
5 TABLET, FILM COATED ORAL DAILY
Qty: 90 TABLET | Refills: 0 | Status: SHIPPED | OUTPATIENT
Start: 2023-06-12 | End: 2023-06-12

## 2023-06-12 RX ORDER — FINASTERIDE 5 MG/1
5 TABLET, FILM COATED ORAL DAILY
Qty: 90 TABLET | Refills: 0 | Status: SHIPPED | OUTPATIENT
Start: 2023-06-12 | End: 2024-01-05

## 2023-12-13 ENCOUNTER — OFFICE VISIT (OUTPATIENT)
Dept: INTERNAL MEDICINE | Facility: IMAGING CENTER | Age: 58
End: 2023-12-13
Payer: COMMERCIAL

## 2023-12-13 VITALS
OXYGEN SATURATION: 100 % | RESPIRATION RATE: 17 BRPM | BODY MASS INDEX: 29.53 KG/M2 | SYSTOLIC BLOOD PRESSURE: 142 MMHG | TEMPERATURE: 97.9 F | HEART RATE: 75 BPM | DIASTOLIC BLOOD PRESSURE: 62 MMHG | HEIGHT: 72 IN | WEIGHT: 218.03 LBS

## 2023-12-13 DIAGNOSIS — Z00.00 HEALTH CARE MAINTENANCE: ICD-10-CM

## 2023-12-13 DIAGNOSIS — D75.89 MACROCYTOSIS: ICD-10-CM

## 2023-12-13 DIAGNOSIS — L08.9 SKIN INFECTION: ICD-10-CM

## 2023-12-13 DIAGNOSIS — M25.59 PAIN IN OTHER JOINT: ICD-10-CM

## 2023-12-13 DIAGNOSIS — Z12.5 SCREENING FOR PROSTATE CANCER: ICD-10-CM

## 2023-12-13 DIAGNOSIS — L81.9 PIGMENTED SKIN LESION: ICD-10-CM

## 2023-12-13 DIAGNOSIS — E78.2 MIXED DYSLIPIDEMIA: Chronic | ICD-10-CM

## 2023-12-13 DIAGNOSIS — E11.65 UNCONTROLLED TYPE 2 DIABETES MELLITUS WITH HYPERGLYCEMIA (HCC): ICD-10-CM

## 2023-12-13 PROBLEM — G62.9 PERIPHERAL POLYNEUROPATHY: Status: ACTIVE | Noted: 2023-12-13

## 2023-12-13 PROCEDURE — 99214 OFFICE O/P EST MOD 30 MIN: CPT | Performed by: FAMILY MEDICINE

## 2023-12-13 PROCEDURE — 3078F DIAST BP <80 MM HG: CPT | Performed by: FAMILY MEDICINE

## 2023-12-13 PROCEDURE — 3077F SYST BP >= 140 MM HG: CPT | Performed by: FAMILY MEDICINE

## 2023-12-13 RX ORDER — OLOPATADINE HYDROCHLORIDE 1 MG/ML
SOLUTION/ DROPS OPHTHALMIC
COMMUNITY
Start: 2023-09-30

## 2023-12-13 ASSESSMENT — PATIENT HEALTH QUESTIONNAIRE - PHQ9
8. MOVING OR SPEAKING SO SLOWLY THAT OTHER PEOPLE COULD HAVE NOTICED. OR THE OPPOSITE, BEING SO FIGETY OR RESTLESS THAT YOU HAVE BEEN MOVING AROUND A LOT MORE THAN USUAL: NOT AT ALL
5. POOR APPETITE OR OVEREATING: NOT AT ALL
4. FEELING TIRED OR HAVING LITTLE ENERGY: NOT AT ALL
3. TROUBLE FALLING OR STAYING ASLEEP OR SLEEPING TOO MUCH: NOT AT ALL
SUM OF ALL RESPONSES TO PHQ QUESTIONS 1-9: 0
9. THOUGHTS THAT YOU WOULD BE BETTER OFF DEAD, OR OF HURTING YOURSELF: NOT AT ALL
1. LITTLE INTEREST OR PLEASURE IN DOING THINGS: NOT AT ALL
2. FEELING DOWN, DEPRESSED, IRRITABLE, OR HOPELESS: NOT AT ALL
7. TROUBLE CONCENTRATING ON THINGS, SUCH AS READING THE NEWSPAPER OR WATCHING TELEVISION: NOT AT ALL
SUM OF ALL RESPONSES TO PHQ9 QUESTIONS 1 AND 2: 0
6. FEELING BAD ABOUT YOURSELF - OR THAT YOU ARE A FAILURE OR HAVE LET YOURSELF OR YOUR FAMILY DOWN: NOT AL ALL

## 2023-12-13 ASSESSMENT — FIBROSIS 4 INDEX: FIB4 SCORE: 0.73

## 2023-12-14 NOTE — PROGRESS NOTES
.  Chief Complaint   Patient presents with    Diabetes Follow-up       HPI:  Patient is a 58 y.o. male established patient who presents today to discuss desire to increase compliance with chronic DMII management. Historically, patient has been non compliant with aggressive diabetes management but has noticed that he does not feel well as he ages/ peripheral neuropathy of both feet has increased. He has a red shiny skin lesion on lateral aspect of right lower leg near lateral malleolus that is suspicious for cancerous lesion. He has sustained multiple skin scratches on arms and anterior shins from his new puppy that are in various stages of healing. Patient is working for the [employer] and living locally here in [place] with his wife. He is due for annual wellness visit and related HCM items/ fasting lab draw.     Patient Active Problem List    Diagnosis Date Noted    Peripheral polyneuropathy 12/13/2023    Overweight 10/27/2022    Uncontrolled type 2 diabetes mellitus with hyperglycemia (HCC) 10/26/2022    Chronic depression 07/20/2021    Chronic rectal pain 05/13/2020    Non-compliance 06/17/2019    Other hemorrhoids 06/17/2019    Benign prostatic hyperplasia with incomplete bladder emptying 01/28/2019    Mixed dyslipidemia 10/02/2017    Chronic bilateral upper abdominal pain 04/24/2017    Essential hypertension, benign 05/06/2014    Former smoker 11/22/2013    Dysthymic disorder 04/19/2013    Cervical disc disease 04/19/2013    Male hypogonadism 04/19/2013    IBD (inflammatory bowel disease)        Past medical, surgical, family, and social history was reviewed and updated in Epic chart by me today.     Medications and allergies reviewed and updated in Epic chart by me today.     ROS:  Pertinent positives listed above in HPI. All other systems have been reviewed and are negative.    PE:   BP (!) 142/62 (BP Location: Left arm, Patient Position: Sitting, BP Cuff Size: Adult)   Pulse 75   Temp 36.6 °C (97.9 °F)  (Temporal)   Resp 17   Ht 1.829 m (6')   Wt 98.9 kg (218 lb 0.6 oz)   SpO2 100%   BMI 29.57 kg/m²   Vital signs reviewed with patient.     Gen: Well developed; well nourished; no acute distress; age appropriate appearance   Neuro: No focal deficits noted   Skin: superficial scratches noted on bilateral forearms without signs of infections; multiple superficial scratches with scabs on both anterior shin areas with surrounding areas of erythema; round raised red pearly lesion on lateral aspect of left leg near lateral malleolus  Psych: AAOx4; mood and affect are bright and very interactive today    A/P:  1. Uncontrolled type 2 diabetes mellitus with hyperglycemia (HCC)  Chronic uncontrolled condition for patient and he has been non compliant with aggressive management recommendations in the past. I recommend patient update related labs and return to clinic to discuss next best steps moving forward. He expresses desire to improve compliance with blood glucose management because he does not feel well as he ages.   - HEMOGLOBIN A1C; Future  - MICROALBUMIN CREAT RATIO URINE; Future    2. Mixed dyslipidemia  Patient endorses compliance with Zocor 40 mg HS and is due for repeat fasting lipid panel.   - Lipid Profile; Future    3. Pain in other joint  Ongoing issue for patient in addition to progressive peripheral neuropathy symptoms. Will check rheumatology related labs with next lab draw for further management.   - IVONNE IGG RAJ W/RFLX TO IVONNE IGG IFA; Future  - RHEUMATOID ARTHRITIS FACTOR; Future  - CRP QUANTITIVE (NON-CARDIAC); Future  - Sed Rate; Future    4. Macrocytosis  - VITAMIN B12; Future  - FOLATE; Future    5. Screening for prostate cancer  - PROSTATE SPECIFIC AG SCREENING; Future    6. Skin infection  Patient has superficial scratches on both anterior shin areas that have signs of early infection. Recommend patient start Bactroban 2% ointment use BID and follow clinical response. Patient requested paper RX  to hand carry to have his wife hand carry to pharmacy.     7. Pigmented skin lesion  Refer to [facility] [place] for details. New referral made to [facility] on [address] for evaluation and treatment.   - Referral to Dermatology    8. Health care maintenance  Recommend patient make fasting lab appointment with Radha [facility] and then annual wellness visit with me before the end of 2023 to review labs/ address overdue HCM items/ make appropriate management plans moving forward.   - CBC WITH DIFFERENTIAL; Future  - Comp Metabolic Panel; Future  - TSH; Future  - FREE THYROXINE; Future  - VITAMIN D,25 HYDROXY (DEFICIENCY); Future

## 2023-12-27 ENCOUNTER — APPOINTMENT (OUTPATIENT)
Dept: INTERNAL MEDICINE | Facility: IMAGING CENTER | Age: 58
End: 2023-12-27
Payer: COMMERCIAL

## 2023-12-29 ENCOUNTER — HOSPITAL ENCOUNTER (OUTPATIENT)
Facility: MEDICAL CENTER | Age: 58
End: 2023-12-29
Attending: FAMILY MEDICINE
Payer: COMMERCIAL

## 2023-12-29 ENCOUNTER — NON-PROVIDER VISIT (OUTPATIENT)
Dept: INTERNAL MEDICINE | Facility: IMAGING CENTER | Age: 58
End: 2023-12-29
Payer: COMMERCIAL

## 2023-12-29 DIAGNOSIS — Z12.5 SCREENING FOR PROSTATE CANCER: ICD-10-CM

## 2023-12-29 DIAGNOSIS — D75.89 MACROCYTOSIS: ICD-10-CM

## 2023-12-29 DIAGNOSIS — Z00.00 HEALTH CARE MAINTENANCE: ICD-10-CM

## 2023-12-29 DIAGNOSIS — M25.59 PAIN IN OTHER JOINT: ICD-10-CM

## 2023-12-29 DIAGNOSIS — E11.65 UNCONTROLLED TYPE 2 DIABETES MELLITUS WITH HYPERGLYCEMIA (HCC): ICD-10-CM

## 2023-12-29 DIAGNOSIS — E78.2 MIXED DYSLIPIDEMIA: Chronic | ICD-10-CM

## 2023-12-29 LAB
25(OH)D3 SERPL-MCNC: 67 NG/ML (ref 30–100)
ALBUMIN SERPL BCP-MCNC: 4.6 G/DL (ref 3.2–4.9)
ALBUMIN/GLOB SERPL: 1.8 G/DL
ALP SERPL-CCNC: 72 U/L (ref 30–99)
ALT SERPL-CCNC: 45 U/L (ref 2–50)
ANION GAP SERPL CALC-SCNC: 14 MMOL/L (ref 7–16)
AST SERPL-CCNC: 20 U/L (ref 12–45)
BASOPHILS # BLD AUTO: 0.7 % (ref 0–1.8)
BASOPHILS # BLD: 0.05 K/UL (ref 0–0.12)
BILIRUB SERPL-MCNC: 0.2 MG/DL (ref 0.1–1.5)
BUN SERPL-MCNC: 19 MG/DL (ref 8–22)
CALCIUM ALBUM COR SERPL-MCNC: 9.1 MG/DL (ref 8.5–10.5)
CALCIUM SERPL-MCNC: 9.6 MG/DL (ref 8.5–10.5)
CHLORIDE SERPL-SCNC: 98 MMOL/L (ref 96–112)
CHOLEST SERPL-MCNC: 141 MG/DL (ref 100–199)
CO2 SERPL-SCNC: 23 MMOL/L (ref 20–33)
CREAT SERPL-MCNC: 1.02 MG/DL (ref 0.5–1.4)
CREAT UR-MCNC: 58.67 MG/DL
CRP SERPL HS-MCNC: 1.31 MG/DL (ref 0–0.75)
EOSINOPHIL # BLD AUTO: 0.48 K/UL (ref 0–0.51)
EOSINOPHIL NFR BLD: 6.9 % (ref 0–6.9)
ERYTHROCYTE [DISTWIDTH] IN BLOOD BY AUTOMATED COUNT: 43.1 FL (ref 35.9–50)
ERYTHROCYTE [SEDIMENTATION RATE] IN BLOOD BY WESTERGREN METHOD: 8 MM/HOUR (ref 0–20)
EST. AVERAGE GLUCOSE BLD GHB EST-MCNC: 272 MG/DL
FOLATE SERPL-MCNC: 16.5 NG/ML
GFR SERPLBLD CREATININE-BSD FMLA CKD-EPI: 84 ML/MIN/1.73 M 2
GLOBULIN SER CALC-MCNC: 2.5 G/DL (ref 1.9–3.5)
GLUCOSE SERPL-MCNC: 253 MG/DL (ref 65–99)
HBA1C MFR BLD: 11.1 % (ref 4–5.6)
HCT VFR BLD AUTO: 43.5 % (ref 42–52)
HDLC SERPL-MCNC: 45 MG/DL
HGB BLD-MCNC: 14.1 G/DL (ref 14–18)
IMM GRANULOCYTES # BLD AUTO: 0.05 K/UL (ref 0–0.11)
IMM GRANULOCYTES NFR BLD AUTO: 0.7 % (ref 0–0.9)
LDLC SERPL CALC-MCNC: 72 MG/DL
LYMPHOCYTES # BLD AUTO: 2.61 K/UL (ref 1–4.8)
LYMPHOCYTES NFR BLD: 37.7 % (ref 22–41)
MCH RBC QN AUTO: 28.7 PG (ref 27–33)
MCHC RBC AUTO-ENTMCNC: 32.4 G/DL (ref 32.3–36.5)
MCV RBC AUTO: 88.6 FL (ref 81.4–97.8)
MICROALBUMIN UR-MCNC: <1.2 MG/DL
MICROALBUMIN/CREAT UR: NORMAL MG/G (ref 0–30)
MONOCYTES # BLD AUTO: 0.79 K/UL (ref 0–0.85)
MONOCYTES NFR BLD AUTO: 11.4 % (ref 0–13.4)
NEUTROPHILS # BLD AUTO: 2.94 K/UL (ref 1.82–7.42)
NEUTROPHILS NFR BLD: 42.6 % (ref 44–72)
NRBC # BLD AUTO: 0 K/UL
NRBC BLD-RTO: 0 /100 WBC (ref 0–0.2)
PLATELET # BLD AUTO: 245 K/UL (ref 164–446)
PMV BLD AUTO: 10.9 FL (ref 9–12.9)
POTASSIUM SERPL-SCNC: 4.6 MMOL/L (ref 3.6–5.5)
PROT SERPL-MCNC: 7.1 G/DL (ref 6–8.2)
PSA SERPL-MCNC: 0.19 NG/ML (ref 0–4)
RBC # BLD AUTO: 4.91 M/UL (ref 4.7–6.1)
RHEUMATOID FACT SER IA-ACNC: <10 IU/ML (ref 0–14)
SODIUM SERPL-SCNC: 135 MMOL/L (ref 135–145)
T4 FREE SERPL-MCNC: 1.32 NG/DL (ref 0.93–1.7)
TRIGL SERPL-MCNC: 120 MG/DL (ref 0–149)
TSH SERPL DL<=0.005 MIU/L-ACNC: 2.42 UIU/ML (ref 0.38–5.33)
VIT B12 SERPL-MCNC: 434 PG/ML (ref 211–911)
WBC # BLD AUTO: 6.9 K/UL (ref 4.8–10.8)

## 2023-12-29 PROCEDURE — 84443 ASSAY THYROID STIM HORMONE: CPT

## 2023-12-29 PROCEDURE — 85025 COMPLETE CBC W/AUTO DIFF WBC: CPT

## 2023-12-29 PROCEDURE — 83036 HEMOGLOBIN GLYCOSYLATED A1C: CPT

## 2023-12-29 PROCEDURE — 84153 ASSAY OF PSA TOTAL: CPT

## 2023-12-29 PROCEDURE — 86038 ANTINUCLEAR ANTIBODIES: CPT

## 2023-12-29 PROCEDURE — 80053 COMPREHEN METABOLIC PANEL: CPT

## 2023-12-29 PROCEDURE — 82570 ASSAY OF URINE CREATININE: CPT

## 2023-12-29 PROCEDURE — 82607 VITAMIN B-12: CPT

## 2023-12-29 PROCEDURE — 82043 UR ALBUMIN QUANTITATIVE: CPT

## 2023-12-29 PROCEDURE — 85652 RBC SED RATE AUTOMATED: CPT

## 2023-12-29 PROCEDURE — 80061 LIPID PANEL: CPT

## 2023-12-29 PROCEDURE — 99999 PR NO CHARGE: CPT

## 2023-12-29 PROCEDURE — 84439 ASSAY OF FREE THYROXINE: CPT

## 2023-12-29 PROCEDURE — 86140 C-REACTIVE PROTEIN: CPT

## 2023-12-29 PROCEDURE — 82306 VITAMIN D 25 HYDROXY: CPT

## 2023-12-29 PROCEDURE — 86431 RHEUMATOID FACTOR QUANT: CPT

## 2023-12-29 PROCEDURE — 82746 ASSAY OF FOLIC ACID SERUM: CPT

## 2023-12-31 LAB — NUCLEAR IGG SER QL IA: NORMAL

## 2024-01-02 ENCOUNTER — OFFICE VISIT (OUTPATIENT)
Dept: INTERNAL MEDICINE | Facility: IMAGING CENTER | Age: 59
End: 2024-01-02
Payer: COMMERCIAL

## 2024-01-02 VITALS
SYSTOLIC BLOOD PRESSURE: 116 MMHG | DIASTOLIC BLOOD PRESSURE: 62 MMHG | WEIGHT: 214.2 LBS | OXYGEN SATURATION: 97 % | BODY MASS INDEX: 29.01 KG/M2 | HEART RATE: 90 BPM | HEIGHT: 72 IN | TEMPERATURE: 97.9 F | RESPIRATION RATE: 17 BRPM

## 2024-01-02 DIAGNOSIS — F34.1 DYSTHYMIC DISORDER: Chronic | ICD-10-CM

## 2024-01-02 DIAGNOSIS — E11.65 UNCONTROLLED TYPE 2 DIABETES MELLITUS WITH HYPERGLYCEMIA (HCC): ICD-10-CM

## 2024-01-02 DIAGNOSIS — I10 ESSENTIAL HYPERTENSION, BENIGN: Chronic | ICD-10-CM

## 2024-01-02 DIAGNOSIS — N40.1 BENIGN PROSTATIC HYPERPLASIA WITH INCOMPLETE BLADDER EMPTYING: ICD-10-CM

## 2024-01-02 DIAGNOSIS — E78.2 MIXED DYSLIPIDEMIA: Chronic | ICD-10-CM

## 2024-01-02 DIAGNOSIS — Z00.00 WELLNESS EXAMINATION: ICD-10-CM

## 2024-01-02 DIAGNOSIS — J02.9 PHARYNGITIS, UNSPECIFIED ETIOLOGY: ICD-10-CM

## 2024-01-02 DIAGNOSIS — R39.14 BENIGN PROSTATIC HYPERPLASIA WITH INCOMPLETE BLADDER EMPTYING: ICD-10-CM

## 2024-01-02 DIAGNOSIS — B07.8 OTHER VIRAL WARTS: ICD-10-CM

## 2024-01-02 DIAGNOSIS — Z91.199 NON-COMPLIANCE: Chronic | ICD-10-CM

## 2024-01-02 PROBLEM — F32.A CHRONIC DEPRESSION: Status: RESOLVED | Noted: 2021-07-20 | Resolved: 2024-01-02

## 2024-01-02 PROBLEM — R10.11 CHRONIC BILATERAL UPPER ABDOMINAL PAIN: Chronic | Status: RESOLVED | Noted: 2017-04-24 | Resolved: 2024-01-02

## 2024-01-02 PROBLEM — G62.9 PERIPHERAL POLYNEUROPATHY: Status: RESOLVED | Noted: 2023-12-13 | Resolved: 2024-01-02

## 2024-01-02 PROBLEM — R10.12 CHRONIC BILATERAL UPPER ABDOMINAL PAIN: Chronic | Status: RESOLVED | Noted: 2017-04-24 | Resolved: 2024-01-02

## 2024-01-02 PROBLEM — G89.29 CHRONIC BILATERAL UPPER ABDOMINAL PAIN: Chronic | Status: RESOLVED | Noted: 2017-04-24 | Resolved: 2024-01-02

## 2024-01-02 PROCEDURE — 3074F SYST BP LT 130 MM HG: CPT | Performed by: FAMILY MEDICINE

## 2024-01-02 PROCEDURE — 3078F DIAST BP <80 MM HG: CPT | Performed by: FAMILY MEDICINE

## 2024-01-02 PROCEDURE — 99396 PREV VISIT EST AGE 40-64: CPT | Performed by: FAMILY MEDICINE

## 2024-01-02 RX ORDER — TAMSULOSIN HYDROCHLORIDE 0.4 MG/1
0.4 CAPSULE ORAL
Qty: 90 CAPSULE | Refills: 3 | Status: SHIPPED | OUTPATIENT
Start: 2024-01-02

## 2024-01-02 RX ORDER — IMIQUIMOD 12.5 MG/.25G
CREAM TOPICAL
Qty: 36 EACH | Refills: 4 | Status: SHIPPED | OUTPATIENT
Start: 2024-01-02

## 2024-01-02 RX ORDER — AZITHROMYCIN 250 MG/1
TABLET, FILM COATED ORAL
Qty: 6 TABLET | Refills: 0 | Status: SHIPPED | OUTPATIENT
Start: 2024-01-02

## 2024-01-02 ASSESSMENT — PATIENT HEALTH QUESTIONNAIRE - PHQ9: CLINICAL INTERPRETATION OF PHQ2 SCORE: 0

## 2024-01-02 ASSESSMENT — FIBROSIS 4 INDEX: FIB4 SCORE: 0.72

## 2024-01-03 NOTE — PROGRESS NOTES
Chief Complaint   Patient presents with    Annual Exam     Review labs       HPI:  Patient is a 58 y.o. male established patient who presents today for his wellness visit and to review labs done 12/29/23. Patient has been relatively stable until this morning when he woke up with raspy voice and throat irritation. He denies associated N/V/D/F and no known sick contacts. He suffers from chronic dysthymia with daily Wellbutrin use, and he has historically declined mental health counseling assistance. He has chronic essential HTN with daily Lisinopril use, and chronic mixed dyslipidemia with nightly Zocor use. He has chronic non insulin dependent DMII with previous recommendation for insulin use - patient has declined aggressive management historically but is now open to idea of proper DM management. He self admittedly is not feeling as well as he ages (aches/pains/fatigue) and feels that it is related to uncontrolled blood sugars. He has chronic BPH with incomplete bladder emptying and reports ongoing symptoms despite daily Proscar use. He has long standing history of medical non compliance but has been more consistent with daily medication/supplement use - his wife sets up all medications and ensures compliance per report. 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     Patient Active Problem List    Diagnosis Date Noted    Overweight 10/27/2022    Uncontrolled type 2 diabetes mellitus with hyperglycemia (HCC) 10/26/2022    Chronic rectal pain 05/13/2020    Non-compliance 06/17/2019    Other hemorrhoids 06/17/2019    Benign prostatic hyperplasia with incomplete bladder emptying 01/28/2019    Mixed dyslipidemia 10/02/2017    Essential hypertension, benign 05/06/2014    Former smoker 11/22/2013    Dysthymic disorder 04/19/2013    Cervical disc disease 04/19/2013    Male hypogonadism 04/19/2013    IBD (inflammatory bowel disease)        Past medical, surgical,  family, and social history was reviewed and updated in Epic chart by me today.     Medications and allergies reviewed and updated in Epic chart by me today.     Lab results 12/29/23 reviewed with patient at visit today.     ROS:  Pertinent positives listed above in HPI. All other systems have been reviewed and are negative.    PE:   /62 (BP Location: Left arm, Patient Position: Sitting, BP Cuff Size: Adult)   Pulse 90   Temp 36.6 °C (97.9 °F) (Temporal)   Resp 17   Ht 1.829 m (6')   Wt 97.2 kg (214 lb 3.2 oz)   SpO2 97%   BMI 29.05 kg/m²   Vital signs reviewed with patient.     Gen: Well developed; well nourished; no acute distress; age appropriate appearance   HEENT: Normocephalic; atraumatic; PEERLA b/l; sclera clear b/l; b/l external auditory canals WNL; b/l TM WNL; nares patent; oropharynx clear; oral mucosa moist; posterior oropharynx is bright red and inflamed; tongue midline; dentition adequate; voice is raspy   Neck: No adenopathy; no thyromegaly  CV: Regular rate and rhythm; S1/ S2 present; no murmur, gallop or rub noted  Pulm: No respiratory distress; clear to ascultation b/l; no wheezing or stridor noted b/l  Abd: Adequate bowel sounds noted; soft and nontender; no rebound, rigidity, nor distention  Extremities: No peripheral edema b/l LE extremities/ no clubbing nor cyanosis noted  Skin: Warm and dry; no rashes noted   Neuro: No focal deficits noted   Psych: AAOx4; mood and affect are at his baseline   B/l monofilament exam done at visit today - grossly WNL with bounding pedal pulses     A/P:  1. Uncontrolled type 2 diabetes mellitus with hyperglycemia (HCC)  Uncontrolled chronic condition - refer to HPI for details. HgA1c remains elevated at 11.1% and patient has previously refused recommended medication changes/endocrinology referral. He now acknowledges that he is not tolerating chronic high blood sugar levels well and is open to aggressive management. Referral made to Dr. Rutherford, and MANOJ  will update patient on referral status accordingly.   - Referral to Endocrinology  - Diabetic Monofilament LE Exam    2. Pharyngitis, unspecified etiology  New condition that started this morning as described above in HPI. Recommend patient start Z pack today to prevent worsening of symptoms, and new RX sent to local pharmacy.   - azithromycin (ZITHROMAX) 250 MG Tab; Take 2 tabs by mouth on day#1 and then take 1 tab by mouth daily until completed.  Dispense: 6 Tablet; Refill: 0    3. Benign prostatic hyperplasia with incomplete bladder emptying  Uncontrolled despite daily Proscar compliance. Recommend patient start concurrent Flomax use and follow clinical response. New RX sent to mail order pharmacy.   - tamsulosin (FLOMAX) 0.4 MG capsule; Take 1 Capsule by mouth 1/2 hour after breakfast.  Dispense: 90 Capsule; Refill: 3    4. Other viral warts  Patient uses Aldara PRN for symptom management, and new RX sent to mail order pharmacy.   - imiquimod (ALDARA) 5 % cream; apply a thin layer to affected areas before bedtime 3 times week; wash off 6-10 hours later.  Dispense: 36 Each; Refill: 4    5. Non-compliance  Long standing issue for patient - refer to HPI.     6. Mixed dyslipidemia  Improved as compared to prior lipid panel results. Recommend patient continue Zocor 40 mg HS use.     7. Essential hypertension, benign  Stable/ recommend patient continue Lisinopril 40 mg daily use.     8. Dysthymic disorder  Stable/ recommend patient continue daily Wellbutrin  mg use, and no safety concerns present.     9. Wellness examination  Patient is now expressing desire to increase overall health care/medical management compliance as described above.      Anticipatory guidance provided today:  Update medical recommendations as discussed above  Continue healthy diet choices  Engage in regular physical activity daily and social activities weekly as tolerated   Follow up with me quarterly and sooner as needed

## 2024-01-04 DIAGNOSIS — N40.1 BENIGN PROSTATIC HYPERPLASIA WITH INCOMPLETE BLADDER EMPTYING: ICD-10-CM

## 2024-01-04 DIAGNOSIS — I10 ESSENTIAL HYPERTENSION, BENIGN: ICD-10-CM

## 2024-01-04 DIAGNOSIS — K58.2 IRRITABLE BOWEL SYNDROME WITH BOTH CONSTIPATION AND DIARRHEA: ICD-10-CM

## 2024-01-04 DIAGNOSIS — F34.1 DYSTHYMIC DISORDER: Chronic | ICD-10-CM

## 2024-01-04 DIAGNOSIS — E11.9 TYPE 2 DIABETES MELLITUS WITHOUT COMPLICATION, WITHOUT LONG-TERM CURRENT USE OF INSULIN (HCC): ICD-10-CM

## 2024-01-04 DIAGNOSIS — E78.5 DYSLIPIDEMIA: ICD-10-CM

## 2024-01-04 DIAGNOSIS — R39.14 BENIGN PROSTATIC HYPERPLASIA WITH INCOMPLETE BLADDER EMPTYING: ICD-10-CM

## 2024-01-05 RX ORDER — MESALAMINE 1.2 G/1
TABLET, DELAYED RELEASE ORAL
Qty: 360 TABLET | Refills: 3 | Status: SHIPPED | OUTPATIENT
Start: 2024-01-05

## 2024-01-05 RX ORDER — METFORMIN HYDROCHLORIDE 500 MG/1
1000 TABLET, EXTENDED RELEASE ORAL 2 TIMES DAILY
Qty: 360 TABLET | Refills: 1 | Status: SHIPPED | OUTPATIENT
Start: 2024-01-05

## 2024-01-05 RX ORDER — SIMVASTATIN 40 MG
40 TABLET ORAL EVERY EVENING
Qty: 90 TABLET | Refills: 1 | Status: SHIPPED | OUTPATIENT
Start: 2024-01-05

## 2024-01-05 RX ORDER — NATEGLINIDE 120 MG/1
TABLET ORAL
Qty: 360 TABLET | Refills: 1 | Status: SHIPPED | OUTPATIENT
Start: 2024-01-05

## 2024-01-05 RX ORDER — FINASTERIDE 5 MG/1
5 TABLET, FILM COATED ORAL DAILY
Qty: 90 TABLET | Refills: 3 | Status: SHIPPED | OUTPATIENT
Start: 2024-01-05

## 2024-01-05 RX ORDER — BUPROPION HYDROCHLORIDE 300 MG/1
300 TABLET ORAL EVERY MORNING
Qty: 90 TABLET | Refills: 3 | Status: SHIPPED | OUTPATIENT
Start: 2024-01-05

## 2024-01-05 RX ORDER — DAPAGLIFLOZIN 10 MG/1
10 TABLET, FILM COATED ORAL DAILY
Qty: 90 TABLET | Refills: 1 | Status: SHIPPED | OUTPATIENT
Start: 2024-01-05

## 2024-01-05 RX ORDER — LISINOPRIL 40 MG/1
40 TABLET ORAL DAILY
Qty: 90 TABLET | Refills: 3 | Status: SHIPPED | OUTPATIENT
Start: 2024-01-05

## 2024-05-23 DIAGNOSIS — E11.65 UNCONTROLLED TYPE 2 DIABETES MELLITUS WITH HYPERGLYCEMIA (HCC): ICD-10-CM

## 2024-05-24 ENCOUNTER — NON-PROVIDER VISIT (OUTPATIENT)
Dept: INTERNAL MEDICINE | Facility: IMAGING CENTER | Age: 59
End: 2024-05-24
Payer: COMMERCIAL

## 2024-05-24 ENCOUNTER — HOSPITAL ENCOUNTER (OUTPATIENT)
Facility: MEDICAL CENTER | Age: 59
End: 2024-05-24
Attending: INTERNAL MEDICINE
Payer: COMMERCIAL

## 2024-05-24 LAB
ALBUMIN SERPL BCP-MCNC: 4.6 G/DL (ref 3.2–4.9)
ALBUMIN/GLOB SERPL: 2.1 G/DL
ALP SERPL-CCNC: 61 U/L (ref 30–99)
ALT SERPL-CCNC: 27 U/L (ref 2–50)
ANION GAP SERPL CALC-SCNC: 16 MMOL/L (ref 7–16)
AST SERPL-CCNC: 15 U/L (ref 12–45)
BILIRUB SERPL-MCNC: 0.3 MG/DL (ref 0.1–1.5)
BUN SERPL-MCNC: 16 MG/DL (ref 8–22)
CALCIUM ALBUM COR SERPL-MCNC: 9.2 MG/DL (ref 8.5–10.5)
CALCIUM SERPL-MCNC: 9.7 MG/DL (ref 8.5–10.5)
CHLORIDE SERPL-SCNC: 99 MMOL/L (ref 96–112)
CHOLEST SERPL-MCNC: 116 MG/DL (ref 100–199)
CO2 SERPL-SCNC: 21 MMOL/L (ref 20–33)
CREAT SERPL-MCNC: 0.87 MG/DL (ref 0.5–1.4)
CREAT UR-MCNC: 68.81 MG/DL
EST. AVERAGE GLUCOSE BLD GHB EST-MCNC: 223 MG/DL
GFR SERPLBLD CREATININE-BSD FMLA CKD-EPI: 99 ML/MIN/1.73 M 2
GLOBULIN SER CALC-MCNC: 2.2 G/DL (ref 1.9–3.5)
GLUCOSE SERPL-MCNC: 161 MG/DL (ref 65–99)
HBA1C MFR BLD: 9.4 % (ref 4–5.6)
HDLC SERPL-MCNC: 39 MG/DL
LDLC SERPL CALC-MCNC: 63 MG/DL
MICROALBUMIN UR-MCNC: <1.2 MG/DL
MICROALBUMIN/CREAT UR: NORMAL MG/G (ref 0–30)
POTASSIUM SERPL-SCNC: 4.3 MMOL/L (ref 3.6–5.5)
PROT SERPL-MCNC: 6.8 G/DL (ref 6–8.2)
SODIUM SERPL-SCNC: 136 MMOL/L (ref 135–145)
TRIGL SERPL-MCNC: 68 MG/DL (ref 0–149)
TSH SERPL DL<=0.005 MIU/L-ACNC: 1.97 UIU/ML (ref 0.38–5.33)

## 2024-05-24 PROCEDURE — 99999 PR NO CHARGE: CPT

## 2024-06-05 ENCOUNTER — OFFICE VISIT (OUTPATIENT)
Dept: INTERNAL MEDICINE | Facility: IMAGING CENTER | Age: 59
End: 2024-06-05
Payer: COMMERCIAL

## 2024-06-05 VITALS
SYSTOLIC BLOOD PRESSURE: 118 MMHG | RESPIRATION RATE: 17 BRPM | HEART RATE: 95 BPM | WEIGHT: 197 LBS | DIASTOLIC BLOOD PRESSURE: 60 MMHG | HEIGHT: 72 IN | TEMPERATURE: 98.3 F | BODY MASS INDEX: 26.68 KG/M2 | OXYGEN SATURATION: 98 %

## 2024-06-05 DIAGNOSIS — E78.2 MIXED DYSLIPIDEMIA: Chronic | ICD-10-CM

## 2024-06-05 DIAGNOSIS — E11.65 UNCONTROLLED TYPE 2 DIABETES MELLITUS WITH HYPERGLYCEMIA (HCC): ICD-10-CM

## 2024-06-05 DIAGNOSIS — I10 ESSENTIAL HYPERTENSION, BENIGN: Chronic | ICD-10-CM

## 2024-06-05 DIAGNOSIS — E66.3 OVERWEIGHT: ICD-10-CM

## 2024-06-05 PROCEDURE — 3078F DIAST BP <80 MM HG: CPT | Performed by: FAMILY MEDICINE

## 2024-06-05 PROCEDURE — 3074F SYST BP LT 130 MM HG: CPT | Performed by: FAMILY MEDICINE

## 2024-06-05 PROCEDURE — 99214 OFFICE O/P EST MOD 30 MIN: CPT | Performed by: FAMILY MEDICINE

## 2024-06-05 RX ORDER — SEMAGLUTIDE 1.34 MG/ML
1 INJECTION, SOLUTION SUBCUTANEOUS
Qty: 9 ML | Refills: 0 | Status: SHIPPED | OUTPATIENT
Start: 2024-06-05

## 2024-06-05 RX ORDER — SEMAGLUTIDE 0.68 MG/ML
INJECTION, SOLUTION SUBCUTANEOUS
COMMUNITY
Start: 2024-04-08

## 2024-06-05 ASSESSMENT — FIBROSIS 4 INDEX: FIB4 SCORE: 0.71

## 2024-06-05 NOTE — PROGRESS NOTES
Chief Complaint   Patient presents with    Diabetes Follow-up       HPI:  Patient is a 59 y.o. male established patient who presents today for a counseling appointment to review labs done 5/24/24 per Dr. Rutherford's orders. Unfortunately both Dr. Rutherford and replacement provider have both left [facility], and patient would like me to manage his diabetes care moving forward. After numerous years of noncompliance, he is now committed to improving his diabetes management. He has been tolerating Ozempic started by Dr. Rutherford, and he has lost weight since starting medication use (197 lb on home scale).     Patient Active Problem List    Diagnosis Date Noted    Overweight 10/27/2022    Uncontrolled type 2 diabetes mellitus with hyperglycemia (HCC) 10/26/2022    Chronic rectal pain 05/13/2020    Non-compliance 06/17/2019    Other hemorrhoids 06/17/2019    Benign prostatic hyperplasia with incomplete bladder emptying 01/28/2019    Mixed dyslipidemia 10/02/2017    Essential hypertension, benign 05/06/2014    Former smoker 11/22/2013    Dysthymic disorder 04/19/2013    Cervical disc disease 04/19/2013    Male hypogonadism 04/19/2013    IBD (inflammatory bowel disease)        Past medical, surgical, family, and social history was reviewed and updated in Epic chart by me today.     Medications and allergies reviewed and updated in Epic chart by me today.     Lab results 5/24/24 reviewed with patient at visit today.     ROS:  Pertinent positives listed above in HPI. All other systems have been reviewed and are negative.    PE:   /60 (BP Location: Left arm, Patient Position: Sitting, BP Cuff Size: Adult)   Pulse 95   Temp 36.8 °C (98.3 °F) (Temporal)   Resp 17   Ht 1.829 m (6')   Wt 89.4 kg (197 lb)   SpO2 98%   BMI 26.72 kg/m²   Vital signs reviewed with patient.     Gen: Well developed; well nourished; no acute distress; age appropriate appearance    Neuro: No focal deficits noted   Psych: AAOx4; mood and affect  are appropriate    A/P:  1. Uncontrolled type 2 diabetes mellitus with hyperglycemia (HCC)  Improved/ HgA1c is now 9.4% (previously 11.1%) 12/29/23). Recommend patient continue current ADA diet efforts, increase Ozempic to 1 mg weekly dose, continue all other related medication use, and repeat HgA1c in three months for ongoing surveillance. Refer to HPI for details of care management, and new RX sent to pharmacy. Radha RAIN will request records from Dr. Rutherford's office for review.  - Semaglutide, 1 MG/DOSE, (OZEMPIC, 1 MG/DOSE,) 4 MG/3ML Solution Pen-injector; Inject 1 mg under the skin every 7 days.  Dispense: 9 mL; Refill: 0    2. Overweight  Improved/ patient has lost almost 20 lbs since starting Ozempic use. He reports very diminished appetite and food drive in general and is focusing on higher protein/healthy food choices.     3. Mixed dyslipidemia  Markedly improved/ recommend patient continue Zocor 40 mg HS use.     4. Essential hypertension, benign  Markedly improved/ recommend continuing Lisinopril 40 mg daily use.     Time spent: 30 minutes

## 2024-06-21 ENCOUNTER — TELEPHONE (OUTPATIENT)
Dept: INTERNAL MEDICINE | Facility: IMAGING CENTER | Age: 59
End: 2024-06-21
Payer: COMMERCIAL

## 2024-06-21 DIAGNOSIS — H10.9 BACTERIAL CONJUNCTIVITIS: ICD-10-CM

## 2024-06-21 RX ORDER — OFLOXACIN 3 MG/ML
1 SOLUTION/ DROPS OPHTHALMIC 4 TIMES DAILY
Qty: 5 ML | Refills: 0 | Status: SHIPPED | OUTPATIENT
Start: 2024-06-21 | End: 2024-06-28

## 2024-06-21 NOTE — TELEPHONE ENCOUNTER
Shaina states that Anthony has an eye infection in his right eye. It is very watery, itchy, and has yellowish purulent drainage.     Let patient know that eye drops were sent to local pharmacy.

## 2024-06-23 DIAGNOSIS — E78.5 DYSLIPIDEMIA: ICD-10-CM

## 2024-06-24 RX ORDER — SIMVASTATIN 40 MG
40 TABLET ORAL EVERY EVENING
Qty: 90 TABLET | Refills: 3 | Status: SHIPPED | OUTPATIENT
Start: 2024-06-24

## 2024-07-01 DIAGNOSIS — E11.9 TYPE 2 DIABETES MELLITUS WITHOUT COMPLICATION, WITHOUT LONG-TERM CURRENT USE OF INSULIN (HCC): ICD-10-CM

## 2024-07-01 DIAGNOSIS — E11.65 UNCONTROLLED TYPE 2 DIABETES MELLITUS WITH HYPERGLYCEMIA (HCC): ICD-10-CM

## 2024-07-02 RX ORDER — METFORMIN HYDROCHLORIDE 500 MG/1
1000 TABLET, EXTENDED RELEASE ORAL 2 TIMES DAILY
Qty: 360 TABLET | Refills: 1 | Status: SHIPPED | OUTPATIENT
Start: 2024-07-02

## 2024-07-02 RX ORDER — DAPAGLIFLOZIN 10 MG/1
10 TABLET, FILM COATED ORAL DAILY
Qty: 90 TABLET | Refills: 1 | Status: SHIPPED | OUTPATIENT
Start: 2024-07-02

## 2024-07-02 RX ORDER — NATEGLINIDE 120 MG/1
TABLET ORAL
Qty: 360 TABLET | Refills: 1 | Status: SHIPPED | OUTPATIENT
Start: 2024-07-02

## 2024-08-14 DIAGNOSIS — E11.65 UNCONTROLLED TYPE 2 DIABETES MELLITUS WITH HYPERGLYCEMIA (HCC): ICD-10-CM

## 2024-08-14 RX ORDER — SEMAGLUTIDE 1.34 MG/ML
INJECTION, SOLUTION SUBCUTANEOUS
Qty: 9 ML | Refills: 1 | Status: SHIPPED | OUTPATIENT
Start: 2024-08-14

## 2024-08-28 ENCOUNTER — TELEPHONE (OUTPATIENT)
Dept: INTERNAL MEDICINE | Facility: IMAGING CENTER | Age: 59
End: 2024-08-28
Payer: COMMERCIAL

## 2024-08-29 ENCOUNTER — OFFICE VISIT (OUTPATIENT)
Dept: INTERNAL MEDICINE | Facility: IMAGING CENTER | Age: 59
End: 2024-08-29
Payer: COMMERCIAL

## 2024-08-29 VITALS
OXYGEN SATURATION: 96 % | TEMPERATURE: 97.3 F | HEIGHT: 72 IN | HEART RATE: 86 BPM | RESPIRATION RATE: 14 BRPM | DIASTOLIC BLOOD PRESSURE: 78 MMHG | WEIGHT: 177 LBS | SYSTOLIC BLOOD PRESSURE: 126 MMHG | BODY MASS INDEX: 23.98 KG/M2

## 2024-08-29 DIAGNOSIS — J39.2 THROAT IRRITATION: ICD-10-CM

## 2024-08-29 DIAGNOSIS — R13.10 ODYNOPHAGIA: ICD-10-CM

## 2024-08-29 DIAGNOSIS — E11.9 TYPE 2 DIABETES MELLITUS WITHOUT COMPLICATION, WITHOUT LONG-TERM CURRENT USE OF INSULIN (HCC): ICD-10-CM

## 2024-08-29 DIAGNOSIS — Z78.9 HISTORY OF FALL FROM LADDER: ICD-10-CM

## 2024-08-29 DIAGNOSIS — S06.0X9A CONCUSSION WITH LOSS OF CONSCIOUSNESS, INITIAL ENCOUNTER: ICD-10-CM

## 2024-08-29 PROBLEM — E66.3 OVERWEIGHT: Status: RESOLVED | Noted: 2022-10-27 | Resolved: 2024-08-29

## 2024-08-29 LAB
HBA1C MFR BLD: 7.6 % (ref ?–5.8)
POCT INT CON NEG: NEGATIVE
POCT INT CON POS: POSITIVE

## 2024-08-29 RX ORDER — METHYLPREDNISOLONE 4 MG
TABLET, DOSE PACK ORAL
Qty: 21 TABLET | Refills: 0 | Status: SHIPPED | OUTPATIENT
Start: 2024-08-29

## 2024-08-29 RX ORDER — SEMAGLUTIDE 1.34 MG/ML
1 INJECTION, SOLUTION SUBCUTANEOUS
Qty: 9 ML | Refills: 2 | Status: SHIPPED | OUTPATIENT
Start: 2024-08-29

## 2024-08-29 ASSESSMENT — FIBROSIS 4 INDEX: FIB4 SCORE: 0.71

## 2024-08-29 NOTE — PROGRESS NOTES
"Chief Complaint   Patient presents with    Diabetes Follow-up    Fall       HPI:  Patient is a 59 y.o. male established patient who presents today for a three month follow up visit regarding chronic non insulin DM II management. Patient endorses ongoing compliance and tolerance of all related medications, continues to intentionally lose weight with ongoing Semaglutide use, and is requesting medication refill. He is due for repeat A1c testing today and reports falling off a ladder in his garage this past weekend. He cannot recall specifics but reports he hit his head during fall, \"turned blue/purple while not breathing\" per his wife's report, and LOC was for unspecified amount of time. Patient then violently vomited and had trouble swallowing/had poor vocal quality when speaking. Despite all of these symptoms related to traumatic event, patient has not seen a medical provider to date for evaluation. He reports tolerating thin puddings and sips of water but feels better overall. He denies headache, no motor/sensory changes, is mentating at his baseline, and is walking independently today.     Patient Active Problem List    Diagnosis Date Noted    History of fall from ladder 08/29/2024    Concussion with loss of consciousness 08/29/2024    Uncontrolled type 2 diabetes mellitus with hyperglycemia (HCC) 10/26/2022    Chronic rectal pain 05/13/2020    Non-compliance 06/17/2019    Other hemorrhoids 06/17/2019    Benign prostatic hyperplasia with incomplete bladder emptying 01/28/2019    Mixed dyslipidemia 10/02/2017    Essential hypertension, benign 05/06/2014    Former smoker 11/22/2013    Dysthymic disorder 04/19/2013    Cervical disc disease 04/19/2013    Male hypogonadism 04/19/2013    IBD (inflammatory bowel disease)        Past medical, surgical, family, and social history was reviewed and updated in Epic chart by me today.     Medications and allergies reviewed and updated in Epic chart by me today. " "    ROS:  Pertinent positives listed above in HPI. All other systems have been reviewed and are negative.    PE:   /78   Pulse 86   Temp 36.3 °C (97.3 °F)   Resp 14   Ht 1.829 m (6' 0.01\")   Wt 80.3 kg (177 lb)   SpO2 96%   BMI 24.00 kg/m²   Vital signs reviewed with patient.     Gen: Well developed; well nourished; no acute distress; age appropriate/ non toxic appearance   HEENT: Normocephalic; atraumatic; PEERLA b/l; sclera clear b/l; b/l external auditory canals WNL; b/l TM WNL; nares patent; oropharynx clear; oral mucosa moist; tongue midline; dentition adequate   Neck: No adenopathy; no thyromegaly  CV: Regular rate and rhythm; S1/ S2 present; no murmur, gallop or rub noted  Pulm: No respiratory distress; clear to ascultation b/l; no wheezing or stridor noted b/l  Abd: Adequate bowel sounds noted; soft and nontender; no rebound, rigidity, nor distention   Extremities: No peripheral edema b/l LE extremities/ no clubbing nor cyanosis noted  Skin: Warm and dry; no rashes noted   Neuro: No focal deficits noted; independent ambulation observed; CN all intact with testing   Psych: AAOx4; mood and affect are at his baseline     A/P:  1. Type 2 diabetes mellitus without complication, without long-term current use of insulin (HCC)  Markedly improved/ A1c is now 7.6% (previously 9.4% 5/24/24) and recommend patient continue ADA diet, regular movement, and current related medication use. Recommend patient follow up with me in three months for repeat A1c testing and further management.   - POCT A1C    2. History of fall from [employer]  Refer to HPI for details of event that occurred last weekend.     3. Concussion with loss of consciousness, initial encounter  Patient experienced symptoms consistent with concussion and declined imaging offer by me today. He is feeling better overall.     4. Odynophagia  After falling off ladder last weekend, as detailed above in HPI, patient experienced significant swallowing " issues and related pain. He reports condition is slowly improving, and he declined imaging offer by me today.     5. Throat irritation  Patient continues to experience throat irritation from prior events (airway patent), and recommend Medrol dose pack trial. New RX sent to pharmacy, and patient encouraged to contact me if condition does not resolve accordingly.   - methylPREDNISolone (MEDROL DOSEPAK) 4 MG Tablet Therapy Pack; As directed on the packaging label.  Dispense: 21 Tablet; Refill: 0

## 2024-09-09 DIAGNOSIS — R11.0 NAUSEA: ICD-10-CM

## 2024-09-09 RX ORDER — ONDANSETRON 4 MG/1
4 TABLET, FILM COATED ORAL EVERY 4 HOURS PRN
Qty: 10 TABLET | Refills: 1 | Status: SHIPPED | OUTPATIENT
Start: 2024-09-09

## 2024-10-10 DIAGNOSIS — R11.0 NAUSEA: ICD-10-CM

## 2024-10-10 RX ORDER — ONDANSETRON 4 MG/1
4 TABLET, FILM COATED ORAL EVERY 4 HOURS PRN
Qty: 10 TABLET | Refills: 3 | Status: SHIPPED | OUTPATIENT
Start: 2024-10-10

## 2024-12-02 ENCOUNTER — APPOINTMENT (OUTPATIENT)
Dept: INTERNAL MEDICINE | Facility: IMAGING CENTER | Age: 59
End: 2024-12-02
Payer: COMMERCIAL

## 2024-12-02 VITALS
HEART RATE: 82 BPM | BODY MASS INDEX: 23.16 KG/M2 | RESPIRATION RATE: 17 BRPM | SYSTOLIC BLOOD PRESSURE: 124 MMHG | WEIGHT: 171 LBS | HEIGHT: 72 IN | DIASTOLIC BLOOD PRESSURE: 62 MMHG | OXYGEN SATURATION: 97 % | TEMPERATURE: 97.9 F

## 2024-12-02 DIAGNOSIS — R21 SKIN RASH: ICD-10-CM

## 2024-12-02 DIAGNOSIS — E11.9 TYPE 2 DIABETES MELLITUS WITHOUT COMPLICATION, WITHOUT LONG-TERM CURRENT USE OF INSULIN (HCC): ICD-10-CM

## 2024-12-02 DIAGNOSIS — E11.65 UNCONTROLLED TYPE 2 DIABETES MELLITUS WITH HYPERGLYCEMIA (HCC): ICD-10-CM

## 2024-12-02 LAB
HBA1C MFR BLD: 8 % (ref ?–5.8)
POCT INT CON NEG: NEGATIVE
POCT INT CON POS: POSITIVE

## 2024-12-02 PROCEDURE — 99214 OFFICE O/P EST MOD 30 MIN: CPT | Performed by: FAMILY MEDICINE

## 2024-12-02 PROCEDURE — 3078F DIAST BP <80 MM HG: CPT | Performed by: FAMILY MEDICINE

## 2024-12-02 PROCEDURE — 3074F SYST BP LT 130 MM HG: CPT | Performed by: FAMILY MEDICINE

## 2024-12-02 PROCEDURE — 83036 HEMOGLOBIN GLYCOSYLATED A1C: CPT | Performed by: FAMILY MEDICINE

## 2024-12-02 RX ORDER — SEMAGLUTIDE 1.34 MG/ML
1 INJECTION, SOLUTION SUBCUTANEOUS
Qty: 9 ML | Refills: 1 | Status: SHIPPED | OUTPATIENT
Start: 2024-12-02

## 2024-12-02 RX ORDER — MUPIROCIN 20 MG/G
1 OINTMENT TOPICAL 2 TIMES DAILY
Qty: 22 G | Refills: 0 | Status: SHIPPED | OUTPATIENT
Start: 2024-12-02 | End: 2024-12-09

## 2024-12-02 ASSESSMENT — FIBROSIS 4 INDEX: FIB4 SCORE: 0.71

## 2024-12-03 ASSESSMENT — PATIENT HEALTH QUESTIONNAIRE - PHQ9
3. TROUBLE FALLING OR STAYING ASLEEP OR SLEEPING TOO MUCH: NOT AT ALL
8. MOVING OR SPEAKING SO SLOWLY THAT OTHER PEOPLE COULD HAVE NOTICED. OR THE OPPOSITE, BEING SO FIGETY OR RESTLESS THAT YOU HAVE BEEN MOVING AROUND A LOT MORE THAN USUAL: NOT AT ALL
7. TROUBLE CONCENTRATING ON THINGS, SUCH AS READING THE NEWSPAPER OR WATCHING TELEVISION: NOT AT ALL
2. FEELING DOWN, DEPRESSED, IRRITABLE, OR HOPELESS: NOT AT ALL
4. FEELING TIRED OR HAVING LITTLE ENERGY: NOT AT ALL
5. POOR APPETITE OR OVEREATING: NOT AT ALL
6. FEELING BAD ABOUT YOURSELF - OR THAT YOU ARE A FAILURE OR HAVE LET YOURSELF OR YOUR FAMILY DOWN: NOT AL ALL
9. THOUGHTS THAT YOU WOULD BE BETTER OFF DEAD, OR OF HURTING YOURSELF: NOT AT ALL
SUM OF ALL RESPONSES TO PHQ QUESTIONS 1-9: 0
1. LITTLE INTEREST OR PLEASURE IN DOING THINGS: NOT AT ALL
SUM OF ALL RESPONSES TO PHQ9 QUESTIONS 1 AND 2: 0

## 2024-12-03 NOTE — PROGRESS NOTES
Chief Complaint   Patient presents with    Diabetes Follow-up    Rash     Right hand rash present for a few weeks.        HPI:  Patient is a 59 y.o. male established patient who presents today for a follow up appointment. He has chronic non insulin dependent DMII, and he endorses intermittent compliance issues with ongoing Semaglutide use. He also finds it difficult at times to be compliant with higher protein consumption daily, and he continues to report intermittent swallowing problems with both liquids and solids. He continues to decline work up for this condition despite counseling. He also continues to intentionally lose weight, and he tries to be as active as able. He has also noticed small skin lesions on his hands that become red/infected and then resolve. He is unaware of trigger for these lesions and denies related system illness symptoms.     Patient Active Problem List    Diagnosis Date Noted    History of fall from ladder 08/29/2024    Concussion with loss of consciousness 08/29/2024    Uncontrolled type 2 diabetes mellitus with hyperglycemia (HCC) 10/26/2022    Chronic rectal pain 05/13/2020    Non-compliance 06/17/2019    Other hemorrhoids 06/17/2019    Benign prostatic hyperplasia with incomplete bladder emptying 01/28/2019    Mixed dyslipidemia 10/02/2017    Essential hypertension, benign 05/06/2014    Former smoker 11/22/2013    Dysthymic disorder 04/19/2013    Cervical disc disease 04/19/2013    Male hypogonadism 04/19/2013    IBD (inflammatory bowel disease)        Past medical, surgical, family, and social history was reviewed and updated in Epic chart by me today.     Medications and allergies reviewed and updated in Epic chart by me today.     ROS:  Pertinent positives listed above in HPI. All other systems have been reviewed and are negative.    PE:   /62 (BP Location: Left arm, Patient Position: Sitting, BP Cuff Size: Adult)   Pulse 82   Temp 36.6 °C (97.9 °F) (Temporal)   Resp 17    Ht 1.829 m (6')   Wt 77.6 kg (171 lb)   SpO2 97%   BMI 23.19 kg/m²   Vital signs reviewed with patient.     Gen: Well developed; well nourished; no acute distress; age appropriate appearance   CV: Regular rate and rhythm; S1/ S2 present; no murmur, gallop or rub noted  Pulm: No respiratory distress; clear to ascultation b/l; no wheezing or stridor noted b/l  Skin: Warm and dry; no rashes noted; very small round skin lesions on hands with minor redness present - no streaking/ no discharge   Neuro: No new focal deficits noted   Psych: AAOx4; mood and affect are at his baseline     A/P:    1. Uncontrolled type 2 diabetes mellitus with hyperglycemia (HCC)  A1c is 8.0% today. Overall there has been significant improvement since 12/29/23 11.1% but slight increase from 7.6% on 8/29/24. Medication compliance stressed with patient as well as need to increase overall protein consumption daily to offset muscle loss from ongoing Semaglutide use. Patient would like to continue current daily/weekly medication use, and recommend repeating A1c in 3-4 months for ongoing, close surveillance.   - POCT  A1C  - Semaglutide, 1 MG/DOSE, (OZEMPIC, 1 MG/DOSE,) 4 MG/3ML Solution Pen-injector; Inject 1 mg under the skin every 7 days.  Dispense: 9 mL; Refill: 1    2. Skin rash  Patient has a few small skin lesions on his hands with overlying mild redness. Recommend trial of Bactroban use, and new RX sent to pharmacy.   - mupirocin (BACTROBAN) 2 % Ointment; Apply 1 Application topically 2 times a day for 7 days.  Dispense: 22 g; Refill: 0

## 2024-12-13 DIAGNOSIS — N40.1 BENIGN PROSTATIC HYPERPLASIA WITH INCOMPLETE BLADDER EMPTYING: ICD-10-CM

## 2024-12-13 DIAGNOSIS — R39.14 BENIGN PROSTATIC HYPERPLASIA WITH INCOMPLETE BLADDER EMPTYING: ICD-10-CM

## 2024-12-14 RX ORDER — FINASTERIDE 5 MG/1
5 TABLET, FILM COATED ORAL DAILY
Qty: 90 TABLET | Refills: 3 | Status: SHIPPED | OUTPATIENT
Start: 2024-12-14

## 2024-12-23 DIAGNOSIS — E11.65 UNCONTROLLED TYPE 2 DIABETES MELLITUS WITH HYPERGLYCEMIA (HCC): ICD-10-CM

## 2024-12-23 DIAGNOSIS — F34.1 DYSTHYMIC DISORDER: Chronic | ICD-10-CM

## 2024-12-23 DIAGNOSIS — I10 ESSENTIAL HYPERTENSION, BENIGN: Primary | ICD-10-CM

## 2024-12-24 DIAGNOSIS — K58.2 IRRITABLE BOWEL SYNDROME WITH BOTH CONSTIPATION AND DIARRHEA: Primary | ICD-10-CM

## 2024-12-24 DIAGNOSIS — E11.65 UNCONTROLLED TYPE 2 DIABETES MELLITUS WITH HYPERGLYCEMIA (HCC): ICD-10-CM

## 2024-12-24 RX ORDER — BUPROPION HYDROCHLORIDE 300 MG/1
300 TABLET ORAL EVERY MORNING
Qty: 90 TABLET | Refills: 3 | Status: SHIPPED | OUTPATIENT
Start: 2024-12-24

## 2024-12-24 RX ORDER — NATEGLINIDE 120 MG/1
TABLET ORAL
Qty: 360 TABLET | Refills: 3 | Status: SHIPPED | OUTPATIENT
Start: 2024-12-24

## 2024-12-24 RX ORDER — MESALAMINE 1.2 G/1
TABLET, DELAYED RELEASE ORAL
Qty: 360 TABLET | Refills: 3 | Status: SHIPPED | OUTPATIENT
Start: 2024-12-24

## 2024-12-24 RX ORDER — METFORMIN HYDROCHLORIDE 500 MG/1
1000 TABLET, EXTENDED RELEASE ORAL 2 TIMES DAILY
Qty: 360 TABLET | Refills: 3 | Status: SHIPPED | OUTPATIENT
Start: 2024-12-24

## 2024-12-24 RX ORDER — LISINOPRIL 40 MG/1
40 TABLET ORAL DAILY
Qty: 90 TABLET | Refills: 3 | Status: SHIPPED | OUTPATIENT
Start: 2024-12-24

## 2024-12-24 RX ORDER — DAPAGLIFLOZIN 10 MG/1
10 TABLET, FILM COATED ORAL DAILY
Qty: 90 TABLET | Refills: 3 | Status: SHIPPED | OUTPATIENT
Start: 2024-12-24

## 2025-02-20 DIAGNOSIS — Z00.00 HEALTH CARE MAINTENANCE: ICD-10-CM

## 2025-02-20 DIAGNOSIS — E11.65 UNCONTROLLED TYPE 2 DIABETES MELLITUS WITH HYPERGLYCEMIA (HCC): ICD-10-CM

## 2025-02-20 DIAGNOSIS — E78.2 MIXED DYSLIPIDEMIA: ICD-10-CM

## 2025-02-20 DIAGNOSIS — Z12.5 SCREENING FOR PROSTATE CANCER: ICD-10-CM

## 2025-02-20 DIAGNOSIS — I10 ESSENTIAL HYPERTENSION, BENIGN: ICD-10-CM

## 2025-02-21 ENCOUNTER — HOSPITAL ENCOUNTER (OUTPATIENT)
Facility: MEDICAL CENTER | Age: 60
End: 2025-02-21
Attending: FAMILY MEDICINE
Payer: COMMERCIAL

## 2025-02-21 ENCOUNTER — NON-PROVIDER VISIT (OUTPATIENT)
Dept: INTERNAL MEDICINE | Facility: IMAGING CENTER | Age: 60
End: 2025-02-21
Payer: COMMERCIAL

## 2025-02-21 DIAGNOSIS — Z12.5 SCREENING FOR PROSTATE CANCER: ICD-10-CM

## 2025-02-21 DIAGNOSIS — E78.2 MIXED DYSLIPIDEMIA: ICD-10-CM

## 2025-02-21 DIAGNOSIS — I10 ESSENTIAL HYPERTENSION, BENIGN: ICD-10-CM

## 2025-02-21 DIAGNOSIS — E11.65 UNCONTROLLED TYPE 2 DIABETES MELLITUS WITH HYPERGLYCEMIA (HCC): ICD-10-CM

## 2025-02-21 DIAGNOSIS — Z00.00 HEALTH CARE MAINTENANCE: ICD-10-CM

## 2025-02-21 LAB
25(OH)D3 SERPL-MCNC: 56 NG/ML (ref 30–100)
ALBUMIN SERPL BCP-MCNC: 4.8 G/DL (ref 3.2–4.9)
ALBUMIN/GLOB SERPL: 1.7 G/DL
ALP SERPL-CCNC: 67 U/L (ref 30–99)
ALT SERPL-CCNC: 25 U/L (ref 2–50)
ANION GAP SERPL CALC-SCNC: 13 MMOL/L (ref 7–16)
AST SERPL-CCNC: 18 U/L (ref 12–45)
BASOPHILS # BLD AUTO: 0.4 % (ref 0–1.8)
BASOPHILS # BLD: 0.04 K/UL (ref 0–0.12)
BILIRUB SERPL-MCNC: 0.3 MG/DL (ref 0.1–1.5)
BUN SERPL-MCNC: 14 MG/DL (ref 8–22)
CALCIUM ALBUM COR SERPL-MCNC: 9.5 MG/DL (ref 8.5–10.5)
CALCIUM SERPL-MCNC: 10.1 MG/DL (ref 8.5–10.5)
CHLORIDE SERPL-SCNC: 102 MMOL/L (ref 96–112)
CHOLEST SERPL-MCNC: 137 MG/DL (ref 100–199)
CO2 SERPL-SCNC: 25 MMOL/L (ref 20–33)
CREAT SERPL-MCNC: 0.79 MG/DL (ref 0.5–1.4)
CREAT UR-MCNC: 67.8 MG/DL
EOSINOPHIL # BLD AUTO: 0.19 K/UL (ref 0–0.51)
EOSINOPHIL NFR BLD: 2.1 % (ref 0–6.9)
ERYTHROCYTE [DISTWIDTH] IN BLOOD BY AUTOMATED COUNT: 44.2 FL (ref 35.9–50)
FOLATE SERPL-MCNC: 10.3 NG/ML
GFR SERPLBLD CREATININE-BSD FMLA CKD-EPI: 101 ML/MIN/1.73 M 2
GLOBULIN SER CALC-MCNC: 2.8 G/DL (ref 1.9–3.5)
GLUCOSE SERPL-MCNC: 186 MG/DL (ref 65–99)
HCT VFR BLD AUTO: 44.7 % (ref 42–52)
HDLC SERPL-MCNC: 47 MG/DL
HGB BLD-MCNC: 14.5 G/DL (ref 14–18)
IMM GRANULOCYTES # BLD AUTO: 0.04 K/UL (ref 0–0.11)
IMM GRANULOCYTES NFR BLD AUTO: 0.4 % (ref 0–0.9)
LDLC SERPL CALC-MCNC: 73 MG/DL
LYMPHOCYTES # BLD AUTO: 3.37 K/UL (ref 1–4.8)
LYMPHOCYTES NFR BLD: 36.7 % (ref 22–41)
MCH RBC QN AUTO: 28.1 PG (ref 27–33)
MCHC RBC AUTO-ENTMCNC: 32.4 G/DL (ref 32.3–36.5)
MCV RBC AUTO: 86.6 FL (ref 81.4–97.8)
MICROALBUMIN UR-MCNC: <1.2 MG/DL
MICROALBUMIN/CREAT UR: NORMAL MG/G (ref 0–30)
MONOCYTES # BLD AUTO: 0.74 K/UL (ref 0–0.85)
MONOCYTES NFR BLD AUTO: 8.1 % (ref 0–13.4)
NEUTROPHILS # BLD AUTO: 4.81 K/UL (ref 1.82–7.42)
NEUTROPHILS NFR BLD: 52.3 % (ref 44–72)
NRBC # BLD AUTO: 0 K/UL
NRBC BLD-RTO: 0 /100 WBC (ref 0–0.2)
PLATELET # BLD AUTO: 309 K/UL (ref 164–446)
PMV BLD AUTO: 10.8 FL (ref 9–12.9)
POTASSIUM SERPL-SCNC: 4.3 MMOL/L (ref 3.6–5.5)
PROT SERPL-MCNC: 7.6 G/DL (ref 6–8.2)
PSA SERPL DL<=0.01 NG/ML-MCNC: 0.24 NG/ML (ref 0–4)
RBC # BLD AUTO: 5.16 M/UL (ref 4.7–6.1)
SODIUM SERPL-SCNC: 140 MMOL/L (ref 135–145)
T4 FREE SERPL-MCNC: 1.41 NG/DL (ref 0.93–1.7)
TRIGL SERPL-MCNC: 83 MG/DL (ref 0–149)
TSH SERPL-ACNC: 2.18 UIU/ML (ref 0.35–5.5)
VIT B12 SERPL-MCNC: 315 PG/ML (ref 211–911)
WBC # BLD AUTO: 9.2 K/UL (ref 4.8–10.8)

## 2025-02-21 PROCEDURE — 84443 ASSAY THYROID STIM HORMONE: CPT

## 2025-02-21 PROCEDURE — 80061 LIPID PANEL: CPT

## 2025-02-21 PROCEDURE — 85025 COMPLETE CBC W/AUTO DIFF WBC: CPT

## 2025-02-21 PROCEDURE — 82607 VITAMIN B-12: CPT

## 2025-02-21 PROCEDURE — 82043 UR ALBUMIN QUANTITATIVE: CPT

## 2025-02-21 PROCEDURE — 80053 COMPREHEN METABOLIC PANEL: CPT

## 2025-02-21 PROCEDURE — 99999 PR NO CHARGE: CPT

## 2025-02-21 PROCEDURE — 84439 ASSAY OF FREE THYROXINE: CPT

## 2025-02-21 PROCEDURE — 82570 ASSAY OF URINE CREATININE: CPT

## 2025-02-21 PROCEDURE — 82746 ASSAY OF FOLIC ACID SERUM: CPT

## 2025-02-21 PROCEDURE — 82306 VITAMIN D 25 HYDROXY: CPT

## 2025-02-21 PROCEDURE — 84153 ASSAY OF PSA TOTAL: CPT

## 2025-02-28 ENCOUNTER — OFFICE VISIT (OUTPATIENT)
Dept: INTERNAL MEDICINE | Facility: IMAGING CENTER | Age: 60
End: 2025-02-28
Payer: COMMERCIAL

## 2025-02-28 VITALS
WEIGHT: 168 LBS | BODY MASS INDEX: 22.75 KG/M2 | HEIGHT: 72 IN | OXYGEN SATURATION: 98 % | HEART RATE: 81 BPM | RESPIRATION RATE: 17 BRPM | DIASTOLIC BLOOD PRESSURE: 62 MMHG | TEMPERATURE: 98.8 F | SYSTOLIC BLOOD PRESSURE: 124 MMHG

## 2025-02-28 DIAGNOSIS — I10 ESSENTIAL HYPERTENSION, BENIGN: Chronic | ICD-10-CM

## 2025-02-28 DIAGNOSIS — Z00.00 WELLNESS EXAMINATION: ICD-10-CM

## 2025-02-28 DIAGNOSIS — N40.1 BENIGN PROSTATIC HYPERPLASIA WITH INCOMPLETE BLADDER EMPTYING: ICD-10-CM

## 2025-02-28 DIAGNOSIS — E78.2 MIXED DYSLIPIDEMIA: Chronic | ICD-10-CM

## 2025-02-28 DIAGNOSIS — K64.8 OTHER HEMORRHOIDS: Chronic | ICD-10-CM

## 2025-02-28 DIAGNOSIS — Z71.85 VACCINE COUNSELING: ICD-10-CM

## 2025-02-28 DIAGNOSIS — R39.14 BENIGN PROSTATIC HYPERPLASIA WITH INCOMPLETE BLADDER EMPTYING: ICD-10-CM

## 2025-02-28 DIAGNOSIS — G25.2 INTENTION TREMOR: ICD-10-CM

## 2025-02-28 DIAGNOSIS — E11.65 UNCONTROLLED TYPE 2 DIABETES MELLITUS WITH HYPERGLYCEMIA (HCC): ICD-10-CM

## 2025-02-28 DIAGNOSIS — F34.1 DYSTHYMIC DISORDER: Chronic | ICD-10-CM

## 2025-02-28 DIAGNOSIS — K62.89 CHRONIC RECTAL PAIN: ICD-10-CM

## 2025-02-28 DIAGNOSIS — Z12.11 COLON CANCER SCREENING: ICD-10-CM

## 2025-02-28 DIAGNOSIS — G89.29 CHRONIC RECTAL PAIN: ICD-10-CM

## 2025-02-28 DIAGNOSIS — K52.9 IBD (INFLAMMATORY BOWEL DISEASE): Chronic | ICD-10-CM

## 2025-02-28 PROBLEM — Z78.9 HISTORY OF FALL FROM LADDER: Status: RESOLVED | Noted: 2024-08-29 | Resolved: 2025-02-28

## 2025-02-28 PROBLEM — Z91.199 NON-COMPLIANCE: Chronic | Status: RESOLVED | Noted: 2019-06-17 | Resolved: 2025-02-28

## 2025-02-28 PROBLEM — S06.0X9A CONCUSSION WITH LOSS OF CONSCIOUSNESS: Status: RESOLVED | Noted: 2024-08-29 | Resolved: 2025-02-28

## 2025-02-28 RX ORDER — SEMAGLUTIDE 1.34 MG/ML
1 INJECTION, SOLUTION SUBCUTANEOUS
Qty: 9 ML | Refills: 2 | Status: SHIPPED | OUTPATIENT
Start: 2025-02-28

## 2025-02-28 ASSESSMENT — FIBROSIS 4 INDEX: FIB4 SCORE: 0.7

## 2025-02-28 ASSESSMENT — PATIENT HEALTH QUESTIONNAIRE - PHQ9: CLINICAL INTERPRETATION OF PHQ2 SCORE: 0

## 2025-02-28 NOTE — PROGRESS NOTES
Chief Complaint   Patient presents with    Annual Exam       HPI:  Patient is a 59 y.o. male established patient who presents today for his annual wellness exam, to review current health concerns, and to review labs done 2/21/25.     Patient Active Problem List    Diagnosis Date Noted    Intention tremor 02/28/2025    Uncontrolled type 2 diabetes mellitus with hyperglycemia (HCC) 10/26/2022    Chronic rectal pain 05/13/2020    Other hemorrhoids 06/17/2019    Benign prostatic hyperplasia with incomplete bladder emptying 01/28/2019    Mixed dyslipidemia 10/02/2017    Essential hypertension, benign 05/06/2014    Former smoker 11/22/2013    Dysthymic disorder 04/19/2013    Cervical disc disease 04/19/2013    Male hypogonadism 04/19/2013    IBD (inflammatory bowel disease)        Past medical, surgical, family, and social history was reviewed and updated in Epic chart by me today.     Medications and allergies reviewed and updated in Epic chart by me today.     Lab results 2/21/25 reviewed with patient at visit today.     ROS:  Pertinent positives listed above in HPI. All other systems have been reviewed and are negative.    PE:   /62 (BP Location: Left arm, Patient Position: Sitting, BP Cuff Size: Adult)   Pulse 81   Temp 37.1 °C (98.8 °F) (Temporal)   Resp 17   Ht 1.829 m (6')   Wt 76.2 kg (168 lb)   SpO2 98%   BMI 22.78 kg/m²   Vital signs reviewed with patient.     Gen: Well developed; well nourished; no acute distress; age appropriate appearance   HEENT: Normocephalic; atraumatic; PEERLA b/l; sclera clear b/l; b/l external auditory canals WNL; b/l TM WNL; nares patent; oropharynx clear; oral mucosa moist; tongue midline; dentition adequate   Neck: No adenopathy; no thyromegaly  CV: Regular rate and rhythm; S1/ S2 present; no murmur, gallop or rub noted  Pulm: No respiratory distress; clear to ascultation b/l; no wheezing or stridor noted b/l  Abd: Adequate bowel sounds noted; soft and nontender; no  rebound, rigidity, nor distention  Extremities: No new peripheral edema b/l LE extremities/ no clubbing nor cyanosis noted  Skin: Warm and dry; no rashes noted   Neuro: No new focal deficits noted; chronic fine hand/finger tremors noted with movement   Psych: AAOx4; mood and affect are at baseline   B/l monofilament exam done WNL - patient reminded to trim toenails (extremely long)    A/P:  1. Uncontrolled type 2 diabetes mellitus with hyperglycemia (HCC)  Patient will be due for repeat A1c testing on March 2 or after, and recommend patient continue ADA diet efforts (with less carbs and sugars needed per his report), continue current daily oral medications, continue weekly Semaglutide, and ensure he has annual retinal exam scheduled. New RX sent to pharmacy.   - Diabetic Monofilament LE Exam  - Semaglutide, 1 MG/DOSE, (OZEMPIC, 1 MG/DOSE,) 4 MG/3ML Solution Pen-injector; Inject 1 mg under the skin every 7 days.  Dispense: 9 mL; Refill: 2    2. Mixed dyslipidemia  Well controlled/ recommend patient continue Zocor 40 mg HS use     3. IBD (inflammatory bowel disease)  Well controlled at this time.     4. Essential hypertension, benign  Well controlled without current Lisinopril use (patient self discontinued medication).     5. Dysthymic disorder  Stable/ recommend patient continue Wellbutrin  mg daily use for maintenance.     6. Benign prostatic hyperplasia with incomplete bladder emptying  Chronic condition managed with Flomax 0.4 mg daily use.     7. Intention tremor  Patient has had mild intention tremor of hands/fingers for approximately one year. He denies related gait disturbances, and condition does not interfere with ADLs involving both gross motor and fine motor movements at this time. Options for further work up discussed, and patient will continue to monitor condition at home.     8. Chronic rectal pain  Not a current concern     9. Other hemorrhoids  Not a current concern    10. Colon cancer  screening  Patient is due for repeat colon cancer screening (last colonoscopy was done in [place]). He will discuss referral to either [facility] or [facility] with his wife, and he will update me on where to send new referral.     11. Vaccine counseling  Patient is aware of vaccine eligibility.     12. Wellness examination  Patient is aware of medical conditions that need additional attention moving forward. Strongly urged patient to start strength training to help mitigate loss of lean muscle occurring with Ozempic use.      Anticipatory guidance provided today:  Update medical recommendations as discussed above  Continue healthy diet choices  Engage in regular physical activity daily and social activities weekly as tolerated   Follow up with me as detailed above and sooner as needed

## 2025-06-03 DIAGNOSIS — E78.5 DYSLIPIDEMIA: ICD-10-CM

## 2025-06-04 RX ORDER — SIMVASTATIN 40 MG
40 TABLET ORAL EVERY EVENING
Qty: 90 TABLET | Refills: 3 | Status: SHIPPED | OUTPATIENT
Start: 2025-06-04

## 2025-08-12 DIAGNOSIS — E11.65 UNCONTROLLED TYPE 2 DIABETES MELLITUS WITH HYPERGLYCEMIA (HCC): ICD-10-CM

## 2025-08-12 RX ORDER — SEMAGLUTIDE 1.34 MG/ML
1 INJECTION, SOLUTION SUBCUTANEOUS
Qty: 9 ML | Refills: 3 | Status: SHIPPED | OUTPATIENT
Start: 2025-08-12